# Patient Record
Sex: MALE | Race: ASIAN | NOT HISPANIC OR LATINO
[De-identification: names, ages, dates, MRNs, and addresses within clinical notes are randomized per-mention and may not be internally consistent; named-entity substitution may affect disease eponyms.]

---

## 2017-03-25 ENCOUNTER — APPOINTMENT (OUTPATIENT)
Dept: INTERNAL MEDICINE | Facility: CLINIC | Age: 67
End: 2017-03-25

## 2017-04-17 ENCOUNTER — EMERGENCY (EMERGENCY)
Facility: HOSPITAL | Age: 67
LOS: 1 days | Discharge: HOME | End: 2017-04-17
Attending: EMERGENCY MEDICINE | Admitting: EMERGENCY MEDICINE
Payer: COMMERCIAL

## 2017-04-17 VITALS
TEMPERATURE: 98 F | RESPIRATION RATE: 16 BRPM | DIASTOLIC BLOOD PRESSURE: 91 MMHG | OXYGEN SATURATION: 96 % | SYSTOLIC BLOOD PRESSURE: 148 MMHG | HEART RATE: 80 BPM

## 2017-04-17 VITALS
OXYGEN SATURATION: 98 % | DIASTOLIC BLOOD PRESSURE: 88 MMHG | SYSTOLIC BLOOD PRESSURE: 144 MMHG | HEART RATE: 78 BPM | RESPIRATION RATE: 16 BRPM

## 2017-04-17 DIAGNOSIS — I10 ESSENTIAL (PRIMARY) HYPERTENSION: ICD-10-CM

## 2017-04-17 DIAGNOSIS — E78.5 HYPERLIPIDEMIA, UNSPECIFIED: ICD-10-CM

## 2017-04-17 DIAGNOSIS — R19.7 DIARRHEA, UNSPECIFIED: ICD-10-CM

## 2017-04-17 LAB
ALBUMIN SERPL ELPH-MCNC: 3.5 G/DL — SIGNIFICANT CHANGE UP (ref 3.4–5)
ALP SERPL-CCNC: 69 U/L — SIGNIFICANT CHANGE UP (ref 40–120)
ALT FLD-CCNC: 20 U/L — SIGNIFICANT CHANGE UP (ref 12–42)
ANION GAP SERPL CALC-SCNC: 8 MMOL/L — LOW (ref 9–16)
AST SERPL-CCNC: 8 U/L — LOW (ref 15–37)
BASOPHILS NFR BLD AUTO: 0.3 % — SIGNIFICANT CHANGE UP (ref 0–2)
BILIRUB SERPL-MCNC: 0.9 MG/DL — SIGNIFICANT CHANGE UP (ref 0.2–1.2)
BUN SERPL-MCNC: 17 MG/DL — SIGNIFICANT CHANGE UP (ref 7–23)
CALCIUM SERPL-MCNC: 8.4 MG/DL — LOW (ref 8.5–10.5)
CHLORIDE SERPL-SCNC: 112 MMOL/L — HIGH (ref 96–108)
CO2 SERPL-SCNC: 21 MMOL/L — LOW (ref 22–31)
CREAT SERPL-MCNC: 1.08 MG/DL — SIGNIFICANT CHANGE UP (ref 0.5–1.3)
EOSINOPHIL NFR BLD AUTO: 1.9 % — SIGNIFICANT CHANGE UP (ref 0–6)
GLUCOSE SERPL-MCNC: 96 MG/DL — SIGNIFICANT CHANGE UP (ref 70–99)
HCT VFR BLD CALC: 42.8 % — SIGNIFICANT CHANGE UP (ref 39–50)
HGB BLD-MCNC: 14.8 G/DL — SIGNIFICANT CHANGE UP (ref 13–17)
LYMPHOCYTES # BLD AUTO: 24.6 % — SIGNIFICANT CHANGE UP (ref 13–44)
MCHC RBC-ENTMCNC: 30.5 PG — SIGNIFICANT CHANGE UP (ref 27–34)
MCHC RBC-ENTMCNC: 34.6 G/DL — SIGNIFICANT CHANGE UP (ref 32–36)
MCV RBC AUTO: 88.2 FL — SIGNIFICANT CHANGE UP (ref 80–100)
MONOCYTES NFR BLD AUTO: 12.4 % — SIGNIFICANT CHANGE UP (ref 2–14)
NEUTROPHILS NFR BLD AUTO: 60.8 % — SIGNIFICANT CHANGE UP (ref 43–77)
PLATELET # BLD AUTO: 243 K/UL — SIGNIFICANT CHANGE UP (ref 150–400)
POTASSIUM SERPL-MCNC: 3.7 MMOL/L — SIGNIFICANT CHANGE UP (ref 3.5–5.3)
POTASSIUM SERPL-SCNC: 3.7 MMOL/L — SIGNIFICANT CHANGE UP (ref 3.5–5.3)
PROT SERPL-MCNC: 7.3 G/DL — SIGNIFICANT CHANGE UP (ref 6.4–8.2)
RBC # BLD: 4.85 M/UL — SIGNIFICANT CHANGE UP (ref 4.2–5.8)
RBC # FLD: 13.7 % — SIGNIFICANT CHANGE UP (ref 10.3–16.9)
SODIUM SERPL-SCNC: 141 MMOL/L — SIGNIFICANT CHANGE UP (ref 135–145)
WBC # BLD: 10.2 K/UL — SIGNIFICANT CHANGE UP (ref 3.8–10.5)
WBC # FLD AUTO: 10.2 K/UL — SIGNIFICANT CHANGE UP (ref 3.8–10.5)

## 2017-04-17 PROCEDURE — 83690 ASSAY OF LIPASE: CPT

## 2017-04-17 PROCEDURE — 99284 EMERGENCY DEPT VISIT MOD MDM: CPT

## 2017-04-17 PROCEDURE — 80053 COMPREHEN METABOLIC PANEL: CPT

## 2017-04-17 PROCEDURE — 36415 COLL VENOUS BLD VENIPUNCTURE: CPT

## 2017-04-17 PROCEDURE — 85025 COMPLETE CBC W/AUTO DIFF WBC: CPT

## 2017-04-17 RX ORDER — SODIUM CHLORIDE 9 MG/ML
1000 INJECTION INTRAMUSCULAR; INTRAVENOUS; SUBCUTANEOUS ONCE
Qty: 0 | Refills: 0 | Status: COMPLETED | OUTPATIENT
Start: 2017-04-17 | End: 2017-04-17

## 2017-04-17 RX ORDER — MOXIFLOXACIN HYDROCHLORIDE TABLETS, 400 MG 400 MG/1
1 TABLET, FILM COATED ORAL
Qty: 6 | Refills: 0 | OUTPATIENT
Start: 2017-04-17 | End: 2017-04-20

## 2017-04-17 RX ADMIN — SODIUM CHLORIDE 2000 MILLILITER(S): 9 INJECTION INTRAMUSCULAR; INTRAVENOUS; SUBCUTANEOUS at 17:25

## 2017-04-17 RX ADMIN — SODIUM CHLORIDE 2000 MILLILITER(S): 9 INJECTION INTRAMUSCULAR; INTRAVENOUS; SUBCUTANEOUS at 16:18

## 2017-04-17 NOTE — ED ADULT NURSE NOTE - OBJECTIVE STATEMENT
received pt in room 4. A&Ox3, presents to ed for c.o diarrhea x 3 days, watery and explosive. denies abd pain. no vomiting. pt recent travel to [facility]. denies cp, no sob. denies fever or chills. iv placed, labs drawn. iv ns infusing. awaiting md nicole. will continue to monitor.

## 2017-04-17 NOTE — ED PROVIDER NOTE - OBJECTIVE STATEMENT
65 y/o male with hx of HTN, HLD c/o diarrhea x 3 days. pt states "many" episodes of watery stool per day. no fever or chills. no abd pain, n/v. no bloody stool or melena. no back pain. pt states arrived back from the [facility] on 4/8/17. no further complaints. no recent antibiotic use. no sick contacts.

## 2017-04-17 NOTE — ED PROVIDER NOTE - ATTENDING CONTRIBUTION TO CARE
67 yo male with non-bloody diarrhea x 3 days 4-5 episodes-- nausea-  no vomiting-  noted recent travel to [facility]- no cough or sob  likely travelers diarrhea - will rx cipro x 3 days  stable for dc - return prec d/w pt including fevers chills N/V

## 2017-04-17 NOTE — ED ADULT TRIAGE NOTE - CHIEF COMPLAINT QUOTE
Patient c/o of diarrhea x3days.  Denies abd pain, denies blood in diarrhea.  Denies recent abx usage, or recent hospitalizations.

## 2017-05-24 ENCOUNTER — APPOINTMENT (OUTPATIENT)
Dept: INTERNAL MEDICINE | Facility: CLINIC | Age: 67
End: 2017-05-24

## 2017-05-24 ENCOUNTER — LABORATORY RESULT (OUTPATIENT)
Age: 67
End: 2017-05-24

## 2017-05-24 ENCOUNTER — NON-APPOINTMENT (OUTPATIENT)
Age: 67
End: 2017-05-24

## 2017-05-24 VITALS
OXYGEN SATURATION: 98 % | RESPIRATION RATE: 18 BRPM | HEART RATE: 57 BPM | BODY MASS INDEX: 28.19 KG/M2 | DIASTOLIC BLOOD PRESSURE: 90 MMHG | TEMPERATURE: 98 F | WEIGHT: 180 LBS | SYSTOLIC BLOOD PRESSURE: 139 MMHG

## 2017-05-24 VITALS — DIASTOLIC BLOOD PRESSURE: 70 MMHG | SYSTOLIC BLOOD PRESSURE: 110 MMHG

## 2017-05-24 DIAGNOSIS — G44.209 TENSION-TYPE HEADACHE, UNSPECIFIED, NOT INTRACTABLE: ICD-10-CM

## 2017-05-24 DIAGNOSIS — Z23 ENCOUNTER FOR IMMUNIZATION: ICD-10-CM

## 2017-05-24 DIAGNOSIS — R79.89 OTHER SPECIFIED ABNORMAL FINDINGS OF BLOOD CHEMISTRY: ICD-10-CM

## 2017-05-24 DIAGNOSIS — I45.10 UNSPECIFIED RIGHT BUNDLE-BRANCH BLOCK: ICD-10-CM

## 2017-05-24 DIAGNOSIS — Z12.11 ENCOUNTER FOR SCREENING FOR MALIGNANT NEOPLASM OF COLON: ICD-10-CM

## 2017-05-24 DIAGNOSIS — J30.2 OTHER SEASONAL ALLERGIC RHINITIS: ICD-10-CM

## 2017-05-25 ENCOUNTER — RESULT REVIEW (OUTPATIENT)
Age: 67
End: 2017-05-25

## 2017-05-25 DIAGNOSIS — Z23 ENCOUNTER FOR IMMUNIZATION: ICD-10-CM

## 2017-05-25 DIAGNOSIS — R82.99 OTHER ABNORMAL FINDINGS IN URINE: ICD-10-CM

## 2017-05-25 DIAGNOSIS — Z20.5 CONTACT WITH AND (SUSPECTED) EXPOSURE TO VIRAL HEPATITIS: ICD-10-CM

## 2017-05-25 DIAGNOSIS — R76.11 NONSPECIFIC REACTION TO TUBERCULIN SKIN TEST W/OUT ACTIVE TUBERCULOSIS: ICD-10-CM

## 2017-05-30 LAB
ADJUSTED MITOGEN: >10 IU/ML
ADJUSTED TB AG: 0.02 IU/ML
ALBUMIN SERPL ELPH-MCNC: 4.4 G/DL
ALP BLD-CCNC: 64 U/L
ALT SERPL-CCNC: 15 U/L
ANION GAP SERPL CALC-SCNC: 13 MMOL/L
APPEARANCE: CLEAR
AST SERPL-CCNC: 20 U/L
BASOPHILS # BLD AUTO: 0.04 K/UL
BASOPHILS NFR BLD AUTO: 0.5 %
BILIRUB SERPL-MCNC: 0.6 MG/DL
BILIRUBIN URINE: NEGATIVE
BLOOD URINE: ABNORMAL
BUN SERPL-MCNC: 17 MG/DL
CALCIUM SERPL-MCNC: 9.2 MG/DL
CHLORIDE SERPL-SCNC: 106 MMOL/L
CHOLEST SERPL-MCNC: 135 MG/DL
CHOLEST/HDLC SERPL: 5 RATIO
CO2 SERPL-SCNC: 22 MMOL/L
COLOR: YELLOW
CREAT SERPL-MCNC: 1.22 MG/DL
EOSINOPHIL # BLD AUTO: 0.36 K/UL
EOSINOPHIL NFR BLD AUTO: 4.6 %
GLUCOSE QUALITATIVE U: NORMAL MG/DL
GLUCOSE SERPL-MCNC: 107 MG/DL
HAV IGG+IGM SER QL: REACTIVE
HBA1C MFR BLD HPLC: 5.6 %
HBV SURFACE AB SER QL: REACTIVE
HBV SURFACE AG SER QL: NONREACTIVE
HCT VFR BLD CALC: 44 %
HCV AB SER QL: NONREACTIVE
HCV S/CO RATIO: 0.09 S/CO
HDLC SERPL-MCNC: 27 MG/DL
HGB BLD-MCNC: 14.2 G/DL
IMM GRANULOCYTES NFR BLD AUTO: 0.3 %
KETONES URINE: NEGATIVE
LDLC SERPL CALC-MCNC: 47 MG/DL
LEUKOCYTE ESTERASE URINE: NEGATIVE
LYMPHOCYTES # BLD AUTO: 2.02 K/UL
LYMPHOCYTES NFR BLD AUTO: 25.8 %
M TB IFN-G BLD-IMP: NEGATIVE
MAN DIFF?: NORMAL
MCHC RBC-ENTMCNC: 30.1 PG
MCHC RBC-ENTMCNC: 32.3 GM/DL
MCV RBC AUTO: 93.2 FL
MONOCYTES # BLD AUTO: 0.32 K/UL
MONOCYTES NFR BLD AUTO: 4.1 %
NEUTROPHILS # BLD AUTO: 5.06 K/UL
NEUTROPHILS NFR BLD AUTO: 64.7 %
NITRITE URINE: NEGATIVE
PH URINE: 6
PLATELET # BLD AUTO: 196 K/UL
POTASSIUM SERPL-SCNC: 4.5 MMOL/L
PROT SERPL-MCNC: 7 G/DL
PROTEIN URINE: 30 MG/DL
PSA SERPL-MCNC: 0.46 NG/ML
QUANTIFERON GOLD NIL: 0.04 IU/ML
RBC # BLD: 4.72 M/UL
RBC # FLD: 14 %
SODIUM SERPL-SCNC: 141 MMOL/L
SPECIFIC GRAVITY URINE: 1.02
TESTOST BND SERPL-MCNC: 5.6 PG/ML
TESTOST SERPL-MCNC: 247.8 NG/DL
TRIGL SERPL-MCNC: 307 MG/DL
TSH SERPL-ACNC: 1.75 UIU/ML
UROBILINOGEN URINE: NORMAL MG/DL
WBC # FLD AUTO: 7.82 K/UL

## 2018-07-28 PROBLEM — J30.2 SEASONAL ALLERGIES: Status: ACTIVE | Noted: 2017-05-24

## 2018-11-28 ENCOUNTER — OUTPATIENT (OUTPATIENT)
Dept: OUTPATIENT SERVICES | Facility: HOSPITAL | Age: 68
LOS: 1 days | End: 2018-11-28
Payer: COMMERCIAL

## 2018-11-28 DIAGNOSIS — R94.39 ABNORMAL RESULT OF OTHER CARDIOVASCULAR FUNCTION STUDY: ICD-10-CM

## 2018-11-28 PROBLEM — I10 ESSENTIAL (PRIMARY) HYPERTENSION: Chronic | Status: ACTIVE | Noted: 2017-04-17

## 2018-11-28 PROBLEM — E78.5 HYPERLIPIDEMIA, UNSPECIFIED: Chronic | Status: ACTIVE | Noted: 2017-04-17

## 2018-11-28 PROCEDURE — 93017 CV STRESS TEST TRACING ONLY: CPT

## 2018-11-28 PROCEDURE — 93018 CV STRESS TEST I&R ONLY: CPT

## 2018-11-28 PROCEDURE — 93016 CV STRESS TEST SUPVJ ONLY: CPT

## 2018-11-28 PROCEDURE — A9502: CPT

## 2018-11-28 PROCEDURE — 78452 HT MUSCLE IMAGE SPECT MULT: CPT | Mod: 26

## 2018-11-28 PROCEDURE — 78452 HT MUSCLE IMAGE SPECT MULT: CPT

## 2019-03-21 NOTE — DATA REVIEWED
[de-identified] : 	\par Exam requested by:\par LOS TOTH MD\par [address]\par [facility] [address]\par SITE PERFORMED: [facility]\par Patient: JOANNE SOLIZ\par YOB: 1950\par Phone: [phone]\par MRN: [id number] Acc: [id number]\par Date of Exam: 12-\par  \par EXAM: MRI CERVICAL SPINE WITHOUT CONTRAST\par \par HISTORY: Neck pain.\par \par TECHNIQUE: MRI of the cervical spine was performed without the administration of intravenous gadolinium on December 29, 2018.\par \par COMPARISON: Comparison is made to the prior MRI of the brain performed on 10/9/2013.\par \par FINDINGS: \par \par There has been anterior cervical discectomy and fusion from the C3-C4 level through to the C5-C6 level. \par \par There is straightening of the normal cervical spine lordosis. There is no gross spondylolisthesis.    \par \par The craniovertebral junction and atlantoaxial articulation are normal. 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 \par \par The vertebral body heights are maintained. There is maintenance of the disc space heights .\par \par There is no bone marrow or soft tissue edema. The bone marrow signal is normal.\par \par At C1-C2, there is no canal stenosis.\par \par At C2-C3, there is a broad-based central disc protrusion with endplate osteophytes and new degenerative changes of the left greater than right facet joints which result in mild canal stenosis with mild left greater than right foraminal narrowing. There is mild flattening of the ventral margin of the spinal cord without associated signal abnormality.\par \par At C3-C4, there is a right lateral endplate osteophyte and degenerative changes of the right uncovertebral joint which result in moderate narrowing of the right lateral recess and neural foramen. There is no canal stenosis.\par \par At C4-C5, there is a left central endplate osteophyte mild/moderate spinal canal stenosis, asymmetric to the left. There is contact of the ventral margin of the spinal cord. There is no foraminal narrowing.\par \par At C5-C6, there are endplate osteophytes, asymmetric to the right which results in moderate canal stenosis with severe right and moderate left foraminal narrowing.\par \par At C6-C7, there is a new central disc protrusion with marginal endplate osteophytes which result in mild canal stenosis with mild right greater than left foraminal narrowing.\par \par At C7-T1, there is a shallow central disc protrusion and mild degenerative changes of the facet joints without canal or foraminal narrowing.\par \par Partially imaged, there are lesions in bilateral lobes of the thyroid gland. The larger of the 2 lesions contains a calcification, measures 1.7 cm and is in the left lobe of the thyroid gland.\par \par IMPRESSION:\par \par Status post interval ACDF from the C3-C4 level through to the C5-C6 level.\par \par Multilevel degenerative disc and endplate changes with degenerative changes of the facet and uncovertebral joints, as described.\par \par C2-C3: Mild canal stenosis with new mild left greater than right foraminal narrowing. There is mild flattening of the ventral margin of the spinal cord without associated signal abnormality.\par \par C5-C6: Mild canal stenosis and severe right and moderate left foraminal narrowing.\par \par C6-C7: New mild canal stenosis with mild right foraminal narrowing.\par \par C4-C5: Mild/moderate canal stenosis asymmetric to the left without foraminal narrowing. \par \par C3-C4: Moderate narrowing of the right lateral recess and neural foramen. No canal stenosis.\par \par Partially imaged, there are lesions in bilateral lobes of the thyroid gland. The larger of the 2 lesions contains a calcification, measures 1.7 cm and is in the left lobe of the thyroid gland.\par \par Ectasia of the basilar artery, partially imaged and grossly unchanged from the prior study.\par Thank you for the opportunity to participate in the care of this patient.  \par  \par VIJAY SHOEMAKER MD  - Electronically Signed: 12- 10:31 AM \par Physician to Physician Direct Line is: [phone]\par Exam requested by:LOS TOTH MD [de-identified] : \par Exam requested by:\par LOS TOTH MD\par [address]\par [facility] [address]\par SITE PERFORMED: [facility]\par Patient: JOANNE SOLIZ\par YOB: 1950\par Phone: [phone]\par MRN: [id number] Acc: [id number]\par Date of Exam: 12-\par  \par EXAM: X-RAY CERVICAL SPINE 6 OR MORE VIEWS\par \par HISTORY: Neck pain\par \par TECHNIQUE: Frontal, lateral, open-mouth, bilateral oblique, and flexion/extension lateral (7) views of the cervical spine were performed on December 29, 2018.\par \par COMPARISON: Comparison is made to the prior study of 10/9/2013. \par \par FINDINGS:  \par \par There has been interval anterior cervical discectomy with fusion at the C3-C4, C4-C5 and C5-C6 levels.\par \par There is an anterior plate spanning the C3-C4, C4-C5 and C5-C6 disc spaces with bilateral anterior vertebral body screws in the respective vertebra. There are lucent disc spacer devices in the intervening disc spaces which appears fused to the adjacent endplates.\par \par The C7 vertebral body is partially obscured by shoulder artifact.\par \par The atlantodental interval is maintained.\par \par There is straightening of the normal cervical spine lordosis. There is no gross spondylolisthesis hours dynamic instability.\par \par The vertebral body heights are maintained. The disc space heights are mildly narrowed at the C2-C3 and C6-C7 levels with associated endplate sclerosis and anterior osteophyte formation. There is spurring of the uncovertebral joints at the C5-C6 and C6-C7 levels and of the left uncovertebral joint at the C3-C4 level.\par \par The bone mineral density is normal. \par \par There is no prevertebral soft tissue swelling.\par \par IMPRESSION:\par \par Status post ACDF from the C3-C4 level through to the C5-C6 level.\par \par There is no fracture of the surgical construct or loosening of screws.\par \par There is fusion across the disc spaces.\par \par Cervical spondylosis above and below the level of the fusion which has developed since the prior study.\par Thank you for the opportunity to participate in the care of this patient.  \par  \par VIJAY SHOEMAKER MD  - Electronically Signed: 12- 10:32 AM \par Physician to Physician Direct Line is: [phone]\par Exam requested by:LOS TOTH MD

## 2019-03-21 NOTE — CONSULT LETTER
[Dear  ___] : Dear  [unfilled], [Consult Letter:] : I had the pleasure of evaluating your patient, [unfilled]. [Please see my note below.] : Please see my note below. [Consult Closing:] : Thank you very much for allowing me to participate in the care of this patient.  If you have any questions, please do not hesitate to contact me. [Sincerely,] : Sincerely, [FreeTextEntry2] : LOS TOTH MD\par [address]\par [facility] [address] [FreeTextEntry3] : Soren Morse MD

## 2019-03-25 ENCOUNTER — APPOINTMENT (OUTPATIENT)
Dept: NEUROSURGERY | Facility: CLINIC | Age: 69
End: 2019-03-25

## 2019-04-24 ENCOUNTER — APPOINTMENT (OUTPATIENT)
Dept: NEPHROLOGY | Facility: CLINIC | Age: 69
End: 2019-04-24
Payer: COMMERCIAL

## 2019-04-24 VITALS — HEART RATE: 60 BPM | DIASTOLIC BLOOD PRESSURE: 62 MMHG | RESPIRATION RATE: 16 BRPM | SYSTOLIC BLOOD PRESSURE: 110 MMHG

## 2019-04-24 DIAGNOSIS — R79.89 OTHER SPECIFIED ABNORMAL FINDINGS OF BLOOD CHEMISTRY: ICD-10-CM

## 2019-04-24 DIAGNOSIS — Z82.49 FAMILY HISTORY OF ISCHEMIC HEART DISEASE AND OTHER DISEASES OF THE CIRCULATORY SYSTEM: ICD-10-CM

## 2019-04-24 DIAGNOSIS — Z80.0 FAMILY HISTORY OF MALIGNANT NEOPLASM OF DIGESTIVE ORGANS: ICD-10-CM

## 2019-04-24 PROCEDURE — 99244 OFF/OP CNSLTJ NEW/EST MOD 40: CPT

## 2019-04-24 RX ORDER — AZELASTINE HYDROCHLORIDE 137 UG/1
137 SPRAY, METERED NASAL
Qty: 30 | Refills: 0 | Status: DISCONTINUED | COMMUNITY
Start: 2019-03-18

## 2019-04-24 RX ORDER — DOXYCYCLINE 100 MG/1
100 CAPSULE ORAL
Qty: 14 | Refills: 0 | Status: DISCONTINUED | COMMUNITY
Start: 2019-03-14

## 2019-04-24 RX ORDER — PREDNISOLONE ACETATE 10 MG/ML
1 SUSPENSION/ DROPS OPHTHALMIC
Qty: 5 | Refills: 0 | Status: DISCONTINUED | COMMUNITY
Start: 2019-03-22

## 2019-04-24 RX ORDER — COLCHICINE 0.6 MG/1
0.6 CAPSULE ORAL
Qty: 9 | Refills: 0 | Status: DISCONTINUED | COMMUNITY
Start: 2019-03-14

## 2019-04-24 RX ORDER — MOMETASONE 50 UG/1
50 SPRAY, METERED NASAL
Qty: 17 | Refills: 0 | Status: DISCONTINUED | COMMUNITY
Start: 2019-03-18

## 2019-04-26 LAB
APPEARANCE: CLEAR
BACTERIA: NEGATIVE
BILIRUBIN URINE: NEGATIVE
BLOOD URINE: NORMAL
COLOR: NORMAL
CREAT SPEC-SCNC: 87 MG/DL
CREAT SPEC-SCNC: 87 MG/DL
CREAT/PROT UR: 0.2 RATIO
GLUCOSE QUALITATIVE U: NEGATIVE
HYALINE CASTS: 0 /LPF
KETONES URINE: NEGATIVE
LEUKOCYTE ESTERASE URINE: NEGATIVE
MICROALBUMIN 24H UR DL<=1MG/L-MCNC: 8.5 MG/DL
MICROALBUMIN/CREAT 24H UR-RTO: 96 MG/G
MICROSCOPIC-UA: NORMAL
NITRITE URINE: NEGATIVE
PH URINE: 7
PROT UR-MCNC: 21 MG/DL
PROTEIN URINE: NORMAL
RED BLOOD CELLS URINE: 0 /HPF
SPECIFIC GRAVITY URINE: 1.02
SQUAMOUS EPITHELIAL CELLS: 0 /HPF
UROBILINOGEN URINE: NORMAL
WHITE BLOOD CELLS URINE: 0 /HPF

## 2019-04-26 NOTE — PHYSICAL EXAM
[General Appearance - Alert] : alert [Sclera] : the sclera and conjunctiva were normal [General Appearance - In No Acute Distress] : in no acute distress [PERRL With Normal Accommodation] : pupils were equal in size, round, and reactive to light [Outer Ear] : the ears and nose were normal in appearance [Extraocular Movements] : extraocular movements were intact [Oropharynx] : the oropharynx was normal [Neck Appearance] : the appearance of the neck was normal [Jugular Venous Distention Increased] : there was no jugular-venous distention [Heart Rate And Rhythm] : heart rate was normal and rhythm regular [Auscultation Breath Sounds / Voice Sounds] : lungs were clear to auscultation bilaterally [Heart Sounds] : normal S1 and S2 [Edema] : there was no peripheral edema [Bowel Sounds] : normal bowel sounds [Abdomen Soft] : soft [Abdomen Tenderness] : non-tender [No CVA Tenderness] : no ~M costovertebral angle tenderness [Involuntary Movements] : no involuntary movements were seen [Musculoskeletal - Swelling] : no joint swelling seen [Skin Color & Pigmentation] : normal skin color and pigmentation [Skin Turgor] : normal skin turgor [] : no rash [Cranial Nerves] : cranial nerves 2-12 were intact [No Focal Deficits] : no focal deficits [Impaired Insight] : insight and judgment were intact [Oriented To Time, Place, And Person] : oriented to person, place, and time [Affect] : the affect was normal

## 2019-04-26 NOTE — REVIEW OF SYSTEMS
[As noted in HPI] : as noted in HPI [Lower Ext Edema] : lower extremity edema [As Noted in HPI] : as noted in HPI [Negative] : Heme/Lymph

## 2019-05-07 LAB — URINE CYTOLOGY: NORMAL

## 2019-05-07 NOTE — ASSESSMENT
[FreeTextEntry1] : 69yo M with non obstructive BPH, gout, longstanding HTN and CKD III here for preop renal optimization prior to spinal surgery:\par \par # CKD III w negligible albuminuria likely 2/2 long standing HTN, did u/a today here which didn't show any hematuria although intermittent hematuria can be associated with IgA nephropathy although less likely.\par Reviewed prior labs, radiology and documentation with patient.\par 4/17/19: Cr 1.6 egfr 43\par 10/18 Cr 1.12 11/18 Cr 1.3\par 2017 Cr 1.22  Hep B/C TB quyen negative. u/a from [facility] showing 5-10 rbcs\par 11/18: 24hr urine 2L collected , CrCl 115 (normal), Cr at that time was 1.3. \par \par Electrolytes are wnl, blood pressure is well controlled, from a renal standpoint he's optimized for surgery. Non urgent renal and bladder sono w PVR for next visit. Recommend not using NSAIDs for pain control. \par \par # HTN - well controlled \par  +NURIA, used cpap mask for 6 months then daytime fatigue improved so he's stopped using it. \par \par # Anemia - mild - 12.2 - next visit will check Fe studies and paraproteinemia w/u\par \par # Gout - uric acid 7 10/18 labs, advised against using NSAIDs or mobic for pain 2/2 potential for renal injury from reduced perfusion\par \par BPH - non obstructive - normal PSA tested late last year, on tamsulosin\par \par Addendum 5/7: urine cytology came back w some contamination likely but noted some urothelial cell atypica. Left voicemail for pt letting him know and suggesting f/u with his urologist for cystoscopy in the next few months.

## 2019-05-07 NOTE — CONSULT LETTER
[Dear  ___] : Dear  [unfilled], [Please see my note below.] : Please see my note below. [Consult Letter:] : I had the pleasure of evaluating your patient, [unfilled]. [Sincerely,] : Sincerely, [Consult Closing:] : Thank you very much for allowing me to participate in the care of this patient.  If you have any questions, please do not hesitate to contact me. [DrTrina  ___] : Dr. HUGHES [FreeTextEntry3] : Jacklyn Foster MD\par  of Medicine\par Division of Kidney Diseases and Hypertension\par [facility] \par [employer] [facility] Tel: [phone]\par Email: [email or web address]\par \par
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
## 2019-07-17 ENCOUNTER — APPOINTMENT (OUTPATIENT)
Dept: ULTRASOUND IMAGING | Facility: CLINIC | Age: 69
End: 2019-07-17
Payer: COMMERCIAL

## 2019-07-17 ENCOUNTER — OUTPATIENT (OUTPATIENT)
Dept: OUTPATIENT SERVICES | Facility: HOSPITAL | Age: 69
LOS: 1 days | End: 2019-07-17
Payer: COMMERCIAL

## 2019-07-17 DIAGNOSIS — Z00.8 ENCOUNTER FOR OTHER GENERAL EXAMINATION: ICD-10-CM

## 2019-07-17 PROCEDURE — 76770 US EXAM ABDO BACK WALL COMP: CPT

## 2019-07-17 PROCEDURE — 76770 US EXAM ABDO BACK WALL COMP: CPT | Mod: 26

## 2019-07-24 ENCOUNTER — APPOINTMENT (OUTPATIENT)
Dept: NEPHROLOGY | Facility: CLINIC | Age: 69
End: 2019-07-24
Payer: COMMERCIAL

## 2019-07-24 VITALS — DIASTOLIC BLOOD PRESSURE: 70 MMHG | HEART RATE: 56 BPM | SYSTOLIC BLOOD PRESSURE: 116 MMHG | RESPIRATION RATE: 14 BRPM

## 2019-07-24 DIAGNOSIS — G47.33 OBSTRUCTIVE SLEEP APNEA (ADULT) (PEDIATRIC): ICD-10-CM

## 2019-07-24 DIAGNOSIS — K59.09 OTHER CONSTIPATION: ICD-10-CM

## 2019-07-24 DIAGNOSIS — D50.9 IRON DEFICIENCY ANEMIA, UNSPECIFIED: ICD-10-CM

## 2019-07-24 DIAGNOSIS — Z99.89 OBSTRUCTIVE SLEEP APNEA (ADULT) (PEDIATRIC): ICD-10-CM

## 2019-07-24 PROCEDURE — 99215 OFFICE O/P EST HI 40 MIN: CPT

## 2019-07-24 RX ORDER — MELOXICAM 7.5 MG/1
7.5 TABLET ORAL
Qty: 30 | Refills: 0 | Status: DISCONTINUED | COMMUNITY
Start: 2019-03-12 | End: 2019-07-24

## 2019-07-24 NOTE — PHYSICAL EXAM
[General Appearance - In No Acute Distress] : in no acute distress [General Appearance - Alert] : alert [Sclera] : the sclera and conjunctiva were normal [Extraocular Movements] : extraocular movements were intact [PERRL With Normal Accommodation] : pupils were equal in size, round, and reactive to light [Outer Ear] : the ears and nose were normal in appearance [Oropharynx] : the oropharynx was normal [Jugular Venous Distention Increased] : there was no jugular-venous distention [Neck Appearance] : the appearance of the neck was normal [FreeTextEntry1] : Visible impairment of mobility in turning head R/L or up/down, rests head in slight R sided and downwards direction for comfort 2/2 cervical fusion, well healed scar posterior neck/between scapulas [Auscultation Breath Sounds / Voice Sounds] : lungs were clear to auscultation bilaterally [Heart Rate And Rhythm] : heart rate was normal and rhythm regular [Heart Sounds] : normal S1 and S2 [Bowel Sounds] : normal bowel sounds [Edema] : there was no peripheral edema [Abdomen Soft] : soft [Abdomen Tenderness] : non-tender [No CVA Tenderness] : no ~M costovertebral angle tenderness [Involuntary Movements] : no involuntary movements were seen [Musculoskeletal - Swelling] : no joint swelling seen [Skin Color & Pigmentation] : normal skin color and pigmentation [Skin Turgor] : normal skin turgor [] : no rash [Cranial Nerves] : cranial nerves 2-12 were intact [No Focal Deficits] : no focal deficits [Oriented To Time, Place, And Person] : oriented to person, place, and time [Impaired Insight] : insight and judgment were intact [Affect] : the affect was normal

## 2019-07-24 NOTE — HISTORY OF PRESENT ILLNESS
[FreeTextEntry1] : 67yo M with non obstructive BPH, longstanding HTN and CKD III here for f/u:\par \par PCP Dr. Ra Coley\par \par In significant pain thoracic spine, and very little neck mobility, significant impairment in his quality of life post May cervical/thoracic spinal surgery. Doesn't like to leave his apt because of the pain and limited mobility. Voice also changed the thinks 2/2 intubation. Denies dysphagia\par \par Avoiding nsaids per surgeon because they slow down the healing process. Using muscle relaxants, tried 3 different ones before now flexeril which is taking the edge off the pain. Still unable to go back to work. Uses 500mg tylenol daily as well. Per pt while in hospital post op he had elevated LFTs 2/2 4g daily tylenol being given. \par Occasional R big toe erythema and pain from gout - takes 1 colchicine and sx resolve w/i 2 days. \par BP has improved he stopped amlodipine. 120-130/80-87 at home. \par \par All other ROS

## 2019-07-24 NOTE — REVIEW OF SYSTEMS
[Hoarseness] : hoarseness [Lower Ext Edema] : no extremity edema [Constipation] : constipation [As Noted in HPI] : as noted in HPI [Joint Stiffness] : joint stiffness [Arthralgias] : arthralgias [Negative] : Heme/Lymph

## 2019-07-24 NOTE — ASSESSMENT
[FreeTextEntry1] : 67yo M with non obstructive BPH, longstanding HTN and CKD III here for f/u:\par \par PCP Dr. Ra Coley\par \par # CKD III w negligible albuminuria likely 2/2 long standing HTN\par - prior u/as at times w <10 RBC however most recent w/o hematuria. Intermittent hematuria can be associated with IgA nephropathy although less likely in this case, CKD likely 22 HTN and fluctuations in Cr likely 2/2 NSAID use in the past. Reviewed July labs Cr improved to 1.2. Needs f/u with  for cystoscopy for atypical urine cytology although noted to be a contaminated urine. Still needs to get bladder/renal sono\par Prior Cr trends:\par 4/17/19: Cr 1.6 egfr 43\par 10/18 Cr 1.12 11/18 Cr 1.3\par 2017 Cr 1.22  Hep B/C TB quyen negative. u/a [place] [facility] showing 5-10 rbcs\par 11/18: 24hr urine 2L collected , CrCl 115 (normal), Cr at that time was 1.3. \par \par # HTN - well controlled off amlodipine only on bystolic\par  +NURIA, daytime fatigue improved so not using cpap\par \par # Anemia - Fe deficient - started Fe tabs BID, significant constipation so also suggested Miralax and bisacodyl suppository. \par \par # Gout - uric acid 7.8 goal <6 - started allopurinol, discussed low risk of rash, to stop if develops. cont prn colchicine for flares\par \par # BPH - non obstructive - normal PSA tested late last year, on tamsulosin, needs f/u with  and bladder sono pending\par \par # vit D def - started 3m of ergo

## 2019-09-10 ENCOUNTER — APPOINTMENT (OUTPATIENT)
Dept: MRI IMAGING | Facility: IMAGING CENTER | Age: 69
End: 2019-09-10
Payer: COMMERCIAL

## 2019-09-10 ENCOUNTER — OUTPATIENT (OUTPATIENT)
Dept: OUTPATIENT SERVICES | Facility: HOSPITAL | Age: 69
LOS: 1 days | End: 2019-09-10
Payer: COMMERCIAL

## 2019-09-10 DIAGNOSIS — Z98.0 INTESTINAL BYPASS AND ANASTOMOSIS STATUS: ICD-10-CM

## 2019-09-10 PROCEDURE — 72146 MRI CHEST SPINE W/O DYE: CPT

## 2019-09-10 PROCEDURE — 72156 MRI NECK SPINE W/O & W/DYE: CPT | Mod: 26

## 2019-09-10 PROCEDURE — 72146 MRI CHEST SPINE W/O DYE: CPT | Mod: 26

## 2019-09-10 PROCEDURE — 72156 MRI NECK SPINE W/O & W/DYE: CPT

## 2019-09-10 PROCEDURE — A9585: CPT

## 2020-01-22 ENCOUNTER — APPOINTMENT (OUTPATIENT)
Dept: NEPHROLOGY | Facility: CLINIC | Age: 70
End: 2020-01-22

## 2021-04-05 ENCOUNTER — LABORATORY RESULT (OUTPATIENT)
Age: 71
End: 2021-04-05

## 2021-04-05 ENCOUNTER — APPOINTMENT (OUTPATIENT)
Dept: NEPHROLOGY | Facility: CLINIC | Age: 71
End: 2021-04-05
Payer: COMMERCIAL

## 2021-04-05 VITALS — SYSTOLIC BLOOD PRESSURE: 120 MMHG | WEIGHT: 190 LBS | BODY MASS INDEX: 29.76 KG/M2 | DIASTOLIC BLOOD PRESSURE: 80 MMHG

## 2021-04-05 DIAGNOSIS — R80.9 PROTEINURIA, UNSPECIFIED: ICD-10-CM

## 2021-04-05 PROCEDURE — 99204 OFFICE O/P NEW MOD 45 MIN: CPT | Mod: 95

## 2021-04-05 RX ORDER — COLCHICINE 0.6 MG/1
0.6 CAPSULE ORAL
Qty: 30 | Refills: 3 | Status: DISCONTINUED | COMMUNITY
Start: 2019-07-24 | End: 2021-04-05

## 2021-04-05 NOTE — REVIEW OF SYSTEMS
[Hoarseness] : hoarseness [Lower Ext Edema] : no extremity edema [Constipation] : constipation [As Noted in HPI] : as noted in HPI [Arthralgias] : arthralgias [Joint Stiffness] : joint stiffness [Negative] : Heme/Lymph

## 2021-04-05 NOTE — REASON FOR VISIT
[Home] : at home, [unfilled] , at the time of the visit. [Medical Office: ([facility])___] : at the medical office located in  [Verbal consent obtained from patient] : the patient, [unfilled] [Initial Evaluation] : an initial evaluation [FreeTextEntry1] : for hematuria, proteinuria

## 2021-04-05 NOTE — ASSESSMENT
[FreeTextEntry1] : Mr. SOLIZ is a 70 year old male with  origin and chronic hx of HTN and hematuria and now CKD over the last few years with worsening proteinuria. IgA Nephropathy is highest on the ddx with other chronic interstitial diseases vs Thin basement membrane likely. FSGS might be seen but more secondary here.\par Overall, this is progressive CKD and a kidney bx may help more for diagnostic and prognostic reasons\par Data on treatment with steroids for IgA nephropathy is weak especially if has had significant scarring on renal bx.\par \par Pt also recently had covid vaccine and will check antibodies\par Proceed with renal bx\par Check coags today \par Most likely will need ARB + SGLT2i combo once renal bx confirms IgA nephropathy for conservative mgt and delay need for dialysis\par \par Pt understands the plan\par f.u after kidney bx

## 2021-04-05 NOTE — HISTORY OF PRESENT ILLNESS
[FreeTextEntry1] : Mr. SOLIZ is a 70 year old male with hx of hematuria for years and CKD with crt in 1.6 range here for an opinion. He has had hematuria dating back to 2004. His crt was normal in 2004 at 1.0mg/dl and since 2018 onwards crt started to rise in 1.3 range and then in 2019 was 1.6. He had cysto done in 2019 that was neg. He was seen by Nephrologist in [facility] for a cards clearance and was stable. Since then has followed with a nephrologist in [facility] Dr Sainz and crt in 1.6mg/dl range. He also has non obstructive BPH, longstanding HTN on beta blockers only.  He used to be on ACEi, not sure when that was stopped and why.\par In significant pain thoracic spine, and very little neck mobility, significant impairment in his quality of life post May cervical/thoracic spinal surgery. Doesn't like to leave his apt because of the pain and limited mobility. Voice also changed the thinks 2/2 intubation. Was taking NSAID in the past.\par Strong FH of cancer but no kidney disease\par Primary Nephrologist concerned re IgA Nephropathy. Recently labs also notable for 1.6gm of proteinuria on a 24 hour collection. \par No chronic use of PPIs or herbal meds. -130/80-87 at home. 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 No decrease in appetite, no termors. No edema worsening. no decreased sleep. No foamy urine. no hematuria, no dysuria. no confusion. No SOB, LYNN. No wt loss.\par \par \par PCP Dr. Ra Coley\par \par \par

## 2021-04-06 LAB
ALBUMIN SERPL ELPH-MCNC: 4.2 G/DL
ANION GAP SERPL CALC-SCNC: 10 MMOL/L
APPEARANCE: CLEAR
APTT BLD: 35.4 SEC
BACTERIA: NEGATIVE
BASOPHILS # BLD AUTO: 0.04 K/UL
BASOPHILS NFR BLD AUTO: 0.5 %
BILIRUBIN URINE: NEGATIVE
BLOOD URINE: ABNORMAL
BUN SERPL-MCNC: 20 MG/DL
C3 SERPL-MCNC: 116 MG/DL
C4 SERPL-MCNC: 25 MG/DL
CALCIUM SERPL-MCNC: 9.3 MG/DL
CHLORIDE SERPL-SCNC: 106 MMOL/L
CO2 SERPL-SCNC: 25 MMOL/L
COLOR: NORMAL
COVID-19 NUCLEOCAPSID  GAM ANTIBODY INTERPRETATION: NEGATIVE
COVID-19 SPIKE DOMAIN ANTIBODY INTERPRETATION: POSITIVE
CREAT SERPL-MCNC: 1.47 MG/DL
CREAT SPEC-SCNC: 215 MG/DL
CREAT/PROT UR: 1 RATIO
EOSINOPHIL # BLD AUTO: 0.14 K/UL
EOSINOPHIL NFR BLD AUTO: 1.6 %
GLUCOSE QUALITATIVE U: NEGATIVE
GLUCOSE SERPL-MCNC: 190 MG/DL
HCT VFR BLD CALC: 42.8 %
HGB BLD-MCNC: 13.7 G/DL
HYALINE CASTS: 1 /LPF
IMM GRANULOCYTES NFR BLD AUTO: 0.3 %
INR PPP: 0.95 RATIO
KETONES URINE: NEGATIVE
LEUKOCYTE ESTERASE URINE: NEGATIVE
LYMPHOCYTES # BLD AUTO: 2.14 K/UL
LYMPHOCYTES NFR BLD AUTO: 24.5 %
MAN DIFF?: NORMAL
MCHC RBC-ENTMCNC: 30.2 PG
MCHC RBC-ENTMCNC: 32 GM/DL
MCV RBC AUTO: 94.3 FL
MICROSCOPIC-UA: NORMAL
MONOCYTES # BLD AUTO: 0.46 K/UL
MONOCYTES NFR BLD AUTO: 5.3 %
MPO AB + PR3 PNL SER: NORMAL
NEUTROPHILS # BLD AUTO: 5.93 K/UL
NEUTROPHILS NFR BLD AUTO: 67.8 %
NITRITE URINE: NEGATIVE
PH URINE: 6
PHOSPHATE SERPL-MCNC: 3.9 MG/DL
PLATELET # BLD AUTO: 204 K/UL
POTASSIUM SERPL-SCNC: 3.9 MMOL/L
PROT UR-MCNC: 215 MG/DL
PROTEIN URINE: ABNORMAL
PT BLD: 11.3 SEC
RBC # BLD: 4.54 M/UL
RBC # FLD: 14.2 %
RED BLOOD CELLS URINE: 5 /HPF
SARS-COV-2 AB SERPL IA-ACNC: >250 U/ML
SARS-COV-2 AB SERPL QL IA: 0.08 INDEX
SODIUM SERPL-SCNC: 141 MMOL/L
SPECIFIC GRAVITY URINE: 1.02
SQUAMOUS EPITHELIAL CELLS: 1 /HPF
URINE COMMENTS: NORMAL
UROBILINOGEN URINE: NORMAL
WBC # FLD AUTO: 8.74 K/UL
WHITE BLOOD CELLS URINE: 4 /HPF

## 2021-04-09 ENCOUNTER — TRANSCRIPTION ENCOUNTER (OUTPATIENT)
Age: 71
End: 2021-04-09

## 2021-04-12 ENCOUNTER — APPOINTMENT (OUTPATIENT)
Dept: ULTRASOUND IMAGING | Facility: HOSPITAL | Age: 71
End: 2021-04-12

## 2021-04-13 ENCOUNTER — OUTPATIENT (OUTPATIENT)
Dept: OUTPATIENT SERVICES | Facility: HOSPITAL | Age: 71
LOS: 1 days | End: 2021-04-13
Payer: COMMERCIAL

## 2021-04-13 ENCOUNTER — APPOINTMENT (OUTPATIENT)
Dept: ULTRASOUND IMAGING | Facility: CLINIC | Age: 71
End: 2021-04-13
Payer: COMMERCIAL

## 2021-04-13 DIAGNOSIS — N18.30 CHRONIC KIDNEY DISEASE, STAGE 3 UNSPECIFIED: ICD-10-CM

## 2021-04-13 PROCEDURE — 76775 US EXAM ABDO BACK WALL LIM: CPT

## 2021-04-13 PROCEDURE — 76775 US EXAM ABDO BACK WALL LIM: CPT | Mod: 26

## 2021-05-04 ENCOUNTER — APPOINTMENT (OUTPATIENT)
Dept: UROLOGY | Facility: CLINIC | Age: 71
End: 2021-05-04
Payer: COMMERCIAL

## 2021-05-04 VITALS
TEMPERATURE: 97.1 F | BODY MASS INDEX: 30.53 KG/M2 | DIASTOLIC BLOOD PRESSURE: 86 MMHG | HEART RATE: 55 BPM | HEIGHT: 66 IN | RESPIRATION RATE: 14 BRPM | WEIGHT: 190 LBS | SYSTOLIC BLOOD PRESSURE: 152 MMHG

## 2021-05-04 DIAGNOSIS — R31.29 OTHER MICROSCOPIC HEMATURIA: ICD-10-CM

## 2021-05-04 DIAGNOSIS — N52.9 MALE ERECTILE DYSFUNCTION, UNSPECIFIED: ICD-10-CM

## 2021-05-04 DIAGNOSIS — N40.1 BENIGN PROSTATIC HYPERPLASIA WITH LOWER URINARY TRACT SYMPMS: ICD-10-CM

## 2021-05-04 DIAGNOSIS — N13.8 BENIGN PROSTATIC HYPERPLASIA WITH LOWER URINARY TRACT SYMPMS: ICD-10-CM

## 2021-05-04 PROCEDURE — 99204 OFFICE O/P NEW MOD 45 MIN: CPT

## 2021-05-04 PROCEDURE — 99072 ADDL SUPL MATRL&STAF TM PHE: CPT

## 2021-05-04 PROCEDURE — 88112 CYTOPATH CELL ENHANCE TECH: CPT | Mod: 26

## 2021-05-04 RX ORDER — MONTELUKAST 10 MG/1
10 TABLET, FILM COATED ORAL DAILY
Qty: 30 | Refills: 5 | Status: COMPLETED | COMMUNITY
Start: 2017-05-24 | End: 2021-05-04

## 2021-05-04 RX ORDER — FERROUS GLUCONATE 240(27)MG
240 (27 FE) TABLET ORAL TWICE DAILY
Qty: 180 | Refills: 2 | Status: COMPLETED | COMMUNITY
Start: 2019-07-24 | End: 2021-05-04

## 2021-05-04 RX ORDER — POLYETHYLENE GLYCOL 3350 17 G/17G
17 POWDER, FOR SOLUTION ORAL
Qty: 1 | Refills: 3 | Status: COMPLETED | COMMUNITY
Start: 2019-07-24 | End: 2021-05-04

## 2021-05-04 NOTE — ASSESSMENT
[FreeTextEntry1] : Microscopic materia: Previous results reviewed.  Recommend repeat urinalysis, urine cytology.  Renal ultrasound results reviewed which showed possible small calculus.  No other significant symptoms.  At this time we will continue to follow, if hematuria worsens or symptoms develop, then will recommend office cystoscopy.\par \par BPH: Continue on finasteride and tamsulosin.\par \par Prostate cancer screening:  PSA f/t today.\par \par Thyroid cyst: Referral given for endocrine evaluation.\par \par Follow up in 6 months.

## 2021-05-04 NOTE — PHYSICAL EXAM
[General Appearance - Well Developed] : well developed [General Appearance - Well Nourished] : well nourished [Normal Appearance] : normal appearance [Well Groomed] : well groomed [General Appearance - In No Acute Distress] : no acute distress [Edema] : no peripheral edema [Respiration, Rhythm And Depth] : normal respiratory rhythm and effort [Exaggerated Use Of Accessory Muscles For Inspiration] : no accessory muscle use [Abdomen Soft] : soft [Abdomen Tenderness] : non-tender [Costovertebral Angle Tenderness] : no ~M costovertebral angle tenderness [Urethral Meatus] : meatus normal [Urinary Bladder Findings] : the bladder was normal on palpation [Scrotum] : the scrotum was normal [Testes Mass (___cm)] : there were no testicular masses [Normal Station and Gait] : the gait and station were normal for the patient's age [] : no rash [No Focal Deficits] : no focal deficits [Oriented To Time, Place, And Person] : oriented to person, place, and time [Affect] : the affect was normal [Mood] : the mood was normal [Not Anxious] : not anxious [No Palpable Adenopathy] : no palpable adenopathy [No Prostate Nodules] : no prostate nodules

## 2021-05-04 NOTE — REVIEW OF SYSTEMS
[Recent Weight Gain (___ Lbs)] : recent [unfilled] ~Ulb weight gain [Eyesight Problems] : eyesight problems [Constipation] : constipation [Diarrhea] : diarrhea [No erections] : no erections [Blood in urine that you can see] : blood visible in urine [History of kidney stones] : history of kidney stones [Wake up at night to urinate  How many times?  ___] : wakes up to urinate [unfilled] times during the night [Limb Swelling] : limb swelling [Difficulty Walking] : difficulty walking [see HPI] : see HPI [Negative] : Musculoskeletal [FreeTextEntry2] : high blood pressure

## 2021-05-04 NOTE — HISTORY OF PRESENT ILLNESS
[FreeTextEntry1] : Patient is a 70 year old man, presents tot he office today with microscopic hematuria. \par Patient reports he has a history of microscopic hematuria, he reports the work up has been negative. \par History of BPH, currently on tamsulosin and finasteride. He reports that urinary stream is good. Has increased urgency but he thinks its related to his increased fluid intake. Nocturia x 2. \par \par Denies any family history of prostate cancer, sister \par Brother currently has colon cancer, sister passed away from cancer of the gallbladder \par \par Recent renal sonogram demonstrated a 3 mm nonobstructing left renal lower pole calculus. \par \par History of erectile dysfunction, reports he was using testosterone creams but stopped. \par \par Non-smoker, works as a vascular ultrasound tech.

## 2021-05-05 LAB
APPEARANCE: CLEAR
BACTERIA UR CULT: NORMAL
BACTERIA: NEGATIVE
BILIRUBIN URINE: NEGATIVE
BLOOD URINE: ABNORMAL
COLOR: NORMAL
GLUCOSE QUALITATIVE U: NEGATIVE
HYALINE CASTS: 0 /LPF
KETONES URINE: NEGATIVE
LEUKOCYTE ESTERASE URINE: NEGATIVE
MICROSCOPIC-UA: NORMAL
NITRITE URINE: NEGATIVE
PH URINE: 6
PROTEIN URINE: ABNORMAL
RED BLOOD CELLS URINE: 2 /HPF
SPECIFIC GRAVITY URINE: 1.01
SQUAMOUS EPITHELIAL CELLS: 0 /HPF
UROBILINOGEN URINE: NORMAL
WHITE BLOOD CELLS URINE: 0 /HPF

## 2021-05-06 LAB
PSA FREE FLD-MCNC: 30 %
PSA FREE SERPL-MCNC: 0.1 NG/ML
PSA SERPL-MCNC: 0.35 NG/ML

## 2021-05-07 LAB
TESTOST BND SERPL-MCNC: 6 PG/ML
TESTOST SERPL-MCNC: 171.1 NG/DL
URINE CYTOLOGY: NORMAL

## 2021-06-15 ENCOUNTER — APPOINTMENT (OUTPATIENT)
Dept: ENDOCRINOLOGY | Facility: CLINIC | Age: 71
End: 2021-06-15
Payer: COMMERCIAL

## 2021-06-15 VITALS
BODY MASS INDEX: 30.53 KG/M2 | SYSTOLIC BLOOD PRESSURE: 145 MMHG | OXYGEN SATURATION: 98 % | HEART RATE: 57 BPM | WEIGHT: 190 LBS | TEMPERATURE: 98.5 F | HEIGHT: 66 IN | DIASTOLIC BLOOD PRESSURE: 88 MMHG

## 2021-06-15 VITALS — SYSTOLIC BLOOD PRESSURE: 130 MMHG | DIASTOLIC BLOOD PRESSURE: 78 MMHG

## 2021-06-15 DIAGNOSIS — E04.1 NONTOXIC SINGLE THYROID NODULE: ICD-10-CM

## 2021-06-15 PROCEDURE — 99072 ADDL SUPL MATRL&STAF TM PHE: CPT

## 2021-06-15 PROCEDURE — 36415 COLL VENOUS BLD VENIPUNCTURE: CPT

## 2021-06-15 PROCEDURE — 99204 OFFICE O/P NEW MOD 45 MIN: CPT | Mod: 25

## 2021-06-16 DIAGNOSIS — D72.829 ELEVATED WHITE BLOOD CELL COUNT, UNSPECIFIED: ICD-10-CM

## 2021-06-16 LAB
25(OH)D3 SERPL-MCNC: 51.9 NG/ML
ALBUMIN SERPL ELPH-MCNC: 4.3 G/DL
ALP BLD-CCNC: 80 U/L
ALT SERPL-CCNC: 21 U/L
ANION GAP SERPL CALC-SCNC: 12 MMOL/L
AST SERPL-CCNC: 20 U/L
BASOPHILS # BLD AUTO: 0.04 K/UL
BASOPHILS NFR BLD AUTO: 0.4 %
BILIRUB SERPL-MCNC: 0.5 MG/DL
BUN SERPL-MCNC: 18 MG/DL
CALCIUM SERPL-MCNC: 9.6 MG/DL
CHLORIDE SERPL-SCNC: 106 MMOL/L
CO2 SERPL-SCNC: 21 MMOL/L
CREAT SERPL-MCNC: 1.29 MG/DL
EOSINOPHIL # BLD AUTO: 0.26 K/UL
EOSINOPHIL NFR BLD AUTO: 2.3 %
GLUCOSE SERPL-MCNC: 86 MG/DL
HCT VFR BLD CALC: 43.8 %
HGB BLD-MCNC: 13.6 G/DL
IMM GRANULOCYTES NFR BLD AUTO: 0.4 %
LYMPHOCYTES # BLD AUTO: 2.86 K/UL
LYMPHOCYTES NFR BLD AUTO: 25.5 %
MAN DIFF?: NORMAL
MCHC RBC-ENTMCNC: 29.7 PG
MCHC RBC-ENTMCNC: 31.1 GM/DL
MCV RBC AUTO: 95.6 FL
MONOCYTES # BLD AUTO: 0.72 K/UL
MONOCYTES NFR BLD AUTO: 6.4 %
NEUTROPHILS # BLD AUTO: 7.27 K/UL
NEUTROPHILS NFR BLD AUTO: 65 %
PLATELET # BLD AUTO: 196 K/UL
POTASSIUM SERPL-SCNC: 4.3 MMOL/L
PROT SERPL-MCNC: 7.8 G/DL
RBC # BLD: 4.58 M/UL
RBC # FLD: 13.9 %
SODIUM SERPL-SCNC: 139 MMOL/L
T3FREE SERPL-MCNC: 2.44 PG/ML
T4 FREE SERPL-MCNC: 1.1 NG/DL
TSH SERPL-ACNC: 4.09 UIU/ML
WBC # FLD AUTO: 11.2 K/UL

## 2021-06-27 NOTE — HISTORY OF PRESENT ILLNESS
[FreeTextEntry1] : Mr. SOLIZ  is a 70 year old  male  who presents for initial endocrine evaluation. He presents with regard to a history of thyroid nodularity. He had cervical thoracic fusion, and thyroid nodule was incidentally found on CT scan. \par Thyroid US from 02/08/2021 revealed right upper pole nodule measuring 0.9 cm, a right cystic nodule measuring 1.0 cm, a rt hypoechoic nodule measuring 1.1 cm, and several left thyroid nodules. \par Pt reports hoarseness. \par \par Pt reports cold intolerances and 15 lb weight gain since Jan 2021. \par \par Had FNA of right nodule in March 2021 in [place] [[facility]] with benign findings per pt . \par Denies FMHx of thyroid disease. \par \par Additional medical history includes that of HTN, HLD, anemia, sleep apnea, IgA nephropathy ?, and gout?, and elevated creatinine. \par On Farxiga for nephropathy.  \par Too, hx of Pre-DM. Diet mostly consists of rice. \par Taking amlodipine, bystolic, finasteride, flomax, allopurinol, crestor, Norvasc, and vitamin D3 weekly.

## 2021-08-06 LAB
THYROGLOB AB SERPL-ACNC: <20 IU/ML
THYROPEROXIDASE AB SERPL IA-ACNC: <10 IU/ML

## 2021-08-17 ENCOUNTER — APPOINTMENT (OUTPATIENT)
Dept: NEPHROLOGY | Facility: CLINIC | Age: 71
End: 2021-08-17
Payer: COMMERCIAL

## 2021-08-17 VITALS
SYSTOLIC BLOOD PRESSURE: 129 MMHG | HEART RATE: 57 BPM | DIASTOLIC BLOOD PRESSURE: 75 MMHG | TEMPERATURE: 97.9 F | HEIGHT: 66 IN | WEIGHT: 191.8 LBS | OXYGEN SATURATION: 100 % | BODY MASS INDEX: 30.82 KG/M2

## 2021-08-17 LAB
BASOPHILS # BLD AUTO: 0.04 K/UL
BASOPHILS NFR BLD AUTO: 0.4 %
EOSINOPHIL # BLD AUTO: 0.21 K/UL
EOSINOPHIL NFR BLD AUTO: 2.1 %
HCT VFR BLD CALC: 41.8 %
HGB BLD-MCNC: 13.5 G/DL
IMM GRANULOCYTES NFR BLD AUTO: 0.3 %
LYMPHOCYTES # BLD AUTO: 2.28 K/UL
LYMPHOCYTES NFR BLD AUTO: 23 %
MAN DIFF?: NORMAL
MCHC RBC-ENTMCNC: 29.9 PG
MCHC RBC-ENTMCNC: 32.3 GM/DL
MCV RBC AUTO: 92.7 FL
MONOCYTES # BLD AUTO: 0.63 K/UL
MONOCYTES NFR BLD AUTO: 6.4 %
NEUTROPHILS # BLD AUTO: 6.73 K/UL
NEUTROPHILS NFR BLD AUTO: 67.8 %
PLATELET # BLD AUTO: 200 K/UL
RBC # BLD: 4.51 M/UL
RBC # FLD: 14.3 %
WBC # FLD AUTO: 9.92 K/UL

## 2021-08-17 PROCEDURE — 99213 OFFICE O/P EST LOW 20 MIN: CPT

## 2021-08-17 NOTE — ASSESSMENT
[FreeTextEntry1] : Mr. SOLIZ is a 70 year old male with  origin and chronic hx of HTN and hematuria and now CKD over the last few years with worsening proteinuria. IgA Nephropathy is highest on the ddx with other chronic interstitial diseases vs Thin basement membrane likely. FSGS might be seen but more secondary here.\par Overall, this is progressive CKD and a kidney bx may help more for diagnostic and prognostic reasons\par Data on treatment with steroids for IgA nephropathy is weak especially if has had significant scarring on renal bx.\par \par IgA Neph with CKD- no bx for now as pt would defer\par Cont ARB + SGLT2i combo for now and monitor renal function and check proteinuria\par Check COVID ab titers\par Check labs today and see if needs any further titration for now\par \par f/.u 2 months

## 2021-08-17 NOTE — HISTORY OF PRESENT ILLNESS
[FreeTextEntry1] : Mr. SOLIZ is a 70 year old male with hx of hematuria for years and CKD with crt in 1.6 range here for an opinion. He has had hematuria dating back to 2004. His crt was normal in 2004 at 1.0mg/dl and since 2018 onwards crt started to rise in 1.3 range and then in 2019 was 1.6. He had cysto done in 2019 that was neg. He was seen by Nephrologist in [facility] for a cards clearance and was stable. Since then has followed with a nephrologist in [facility] Dr Sainz and crt in 1.6mg/dl range. He also has non obstructive BPH, longstanding HTN on beta blockers only.  He used to be on ACEi, not sure when that was stopped and why.\par In significant pain thoracic spine, and very little neck mobility, significant impairment in his quality of life post May cervical/thoracic spinal surgery. Doesn't like to leave his apt because of the pain and limited mobility. Voice also changed the thinks 2/2 intubation. Was taking NSAID in the past.\par Strong FH of cancer but no kidney disease\par Primary Nephrologist concerned re IgA Nephropathy. Recently labs also notable for 1.6gm of proteinuria on a 24 hour collection. \par Since last visit, didn't ge renal bx but had started on ARB and SLGT2i and feels better. urinating more than usual. Feels more energy. Most recent UA with less protein and crt is improved. \par No n/v. No decrease in appetite, no termors. No edema worsening. no decreased sleep. No foamy urine. no hematuria, no dysuria. no confusion. No SOB, LYNN. No wt loss.\par \par \par \par PCP Dr. Ra Coley\par \par \par

## 2021-08-18 LAB
25(OH)D3 SERPL-MCNC: 40.8 NG/ML
ALBUMIN SERPL ELPH-MCNC: 4.2 G/DL
ANION GAP SERPL CALC-SCNC: 13 MMOL/L
APPEARANCE: CLEAR
BACTERIA: NEGATIVE
BILIRUBIN URINE: NEGATIVE
BLOOD URINE: ABNORMAL
BUN SERPL-MCNC: 20 MG/DL
CALCIUM SERPL-MCNC: 9.8 MG/DL
CHLORIDE SERPL-SCNC: 105 MMOL/L
CO2 SERPL-SCNC: 22 MMOL/L
COLOR: NORMAL
COVID-19 SPIKE DOMAIN ANTIBODY INTERPRETATION: POSITIVE
CREAT SERPL-MCNC: 1.27 MG/DL
CREAT SPEC-SCNC: 54 MG/DL
CREAT/PROT UR: 1 RATIO
ESTIMATED AVERAGE GLUCOSE: 114 MG/DL
GLUCOSE QUALITATIVE U: ABNORMAL
GLUCOSE SERPL-MCNC: 88 MG/DL
HBA1C MFR BLD HPLC: 5.6 %
HYALINE CASTS: 0 /LPF
KETONES URINE: NEGATIVE
LEUKOCYTE ESTERASE URINE: NEGATIVE
MICROSCOPIC-UA: NORMAL
NITRITE URINE: NEGATIVE
PH URINE: 6
PHOSPHATE SERPL-MCNC: 3.6 MG/DL
POTASSIUM SERPL-SCNC: 3.9 MMOL/L
PROT UR-MCNC: 53 MG/DL
PROTEIN URINE: ABNORMAL
RED BLOOD CELLS URINE: 0 /HPF
SARS-COV-2 AB SERPL IA-ACNC: >250 U/ML
SODIUM SERPL-SCNC: 140 MMOL/L
SPECIFIC GRAVITY URINE: 1.01
SQUAMOUS EPITHELIAL CELLS: 0 /HPF
UROBILINOGEN URINE: NORMAL
WHITE BLOOD CELLS URINE: 1 /HPF

## 2021-08-20 NOTE — ED PROVIDER NOTE - NO SIGNIFICANT PAST SURGICAL HISTORY
<<----- Click to add NO significant Past Surgical History Drysol Counseling:  I discussed with the patient the risks of drysol/aluminum chloride including but not limited to skin rash, itching, irritation, burning.

## 2021-11-02 ENCOUNTER — APPOINTMENT (OUTPATIENT)
Dept: UROLOGY | Facility: CLINIC | Age: 71
End: 2021-11-02

## 2021-12-02 ENCOUNTER — RX RENEWAL (OUTPATIENT)
Age: 71
End: 2021-12-02

## 2022-01-10 ENCOUNTER — APPOINTMENT (OUTPATIENT)
Dept: GASTROENTEROLOGY | Facility: CLINIC | Age: 72
End: 2022-01-10
Payer: COMMERCIAL

## 2022-01-10 VITALS
TEMPERATURE: 97.7 F | WEIGHT: 193 LBS | HEIGHT: 66 IN | OXYGEN SATURATION: 97 % | SYSTOLIC BLOOD PRESSURE: 125 MMHG | BODY MASS INDEX: 31.02 KG/M2 | DIASTOLIC BLOOD PRESSURE: 79 MMHG | HEART RATE: 59 BPM

## 2022-01-10 DIAGNOSIS — E73.9 LACTOSE INTOLERANCE, UNSPECIFIED: ICD-10-CM

## 2022-01-10 DIAGNOSIS — K29.70 GASTRITIS, UNSPECIFIED, W/OUT BLEEDING: ICD-10-CM

## 2022-01-10 DIAGNOSIS — K52.9 NONINFECTIVE GASTROENTERITIS AND COLITIS, UNSPECIFIED: ICD-10-CM

## 2022-01-10 DIAGNOSIS — B96.81 GASTRITIS, UNSPECIFIED, W/OUT BLEEDING: ICD-10-CM

## 2022-01-10 DIAGNOSIS — K27.9 PEPTIC ULCER, SITE UNSPECIFIED, UNSPECIFIED AS ACUTE OR CHRONIC, W/OUT HEMORRHAGE OR PERFORATION: ICD-10-CM

## 2022-01-10 PROCEDURE — 99205 OFFICE O/P NEW HI 60 MIN: CPT

## 2022-01-10 NOTE — CONSULT LETTER
[Dear  ___] : Dear  [unfilled], [Consult Letter:] : I had the pleasure of evaluating your patient, [unfilled]. [( Thank you for referring [unfilled] for consultation for _____ )] : Thank you for referring [unfilled] for consultation for [unfilled] [Please see my note below.] : Please see my note below. [Consult Closing:] : Thank you very much for allowing me to participate in the care of this patient.  If you have any questions, please do not hesitate to contact me. [Sincerely,] : Sincerely, [FreeTextEntry2] : Dr. Ra Coley. [FreeTextEntry3] : Denzel Weber MD

## 2022-01-10 NOTE — HISTORY OF PRESENT ILLNESS
[FreeTextEntry1] : Office consultation on 1/10/2021.\par \par The patient is a 71-year-old man evaluated with complaint of diarrhea of 3 to 4 years duration.  PMD: Dr. Ra Coley.\par \par Patient's chief complaint is diarrhea of 3 to 4 years duration.\par \par Patient's bowel habit characterized as infrequent bowel movements of many years duration.  In the past he recalls his typical bowel movement characterized as a formed [place] 4 bowel movements twice per week.\par \par The patient underwent an open cholecystectomy in .  Ever since then he has had increased flatus.  In addition, following laparoscopic cholecystectomy he observed more softer mushy stools with passage of a [place] 5 bowel movement but still twice per week.\par \par Has longstanding history of lactose intolerance with milk products triggering bloating and gas.\par \par Current complaint of diarrhea has been for 3 to 4 years.  Precipitating factors at that time not reported.  Of note, he describes his episodes of diarrhea as passage of a single watery unformed diarrheal [place] 7 bowel movement once weekly.  As noted, despite his complaint of watery diarrhea of 3 to 4 years duration he still only has 1 bowel movement per week.\par \par For the past 1-1/2-year he describes complaint of "no feeling of movement".  Seems to imply not feeling urge to move his bowels.\par \par Previously evaluated by gastroenterology regarding current symptoms.  Had colonoscopy in 2019 at which time colon polyp removed.  Await report for review.  Not clear if biopsies taken to evaluate for microscopic colitis.  Because of complaint of "no feeling of movement" with infrequent bowel movements patient indicates he is on a regimen of Linzess that he takes approximately once per week.  Does not know dose.  Complaint of diarrhea with watery bowel movements albeit once weekly preceded starting Linzess.\par \par Indicates taking a sugar substitute that may contain sorbitol.\par \par Indicates having previously tried psyllium for 1 month which he does not feel helped.\par \par Intermittently can observe scant red blood on toilet paper only after moving his bowels of many years duration.  No increased rectal bleeding reported.\par \par As noted, for the past 3 to 4 years he has complained of diarrhea described as passage of a watery [place] 7 bowel movement once per week.  On days he has diarrhea he only has 1 diarrheal stool.  During the other 6 days he has no bowel movement.  Does not feel urge to move his bowels.  Denies experiencing abdominal pain in association with diarrhea episodes.  However, does complain of excessive gas and flatus.\par \par Patient was told of having IBS sometime in the .\par \par Patient indicates treatment for an upper GI bleed in  attributed to peptic ulcer disease and he describes being treated for Helicobacter pylori at that time.  Does not indicate whether successful eradication was confirmed.\par \par Denies fever.  Appetite stable.  Has gained 10 pounds over the past few years.  Denies complaints of oral ulcers, dysphagia, heartburn, nausea, vomiting or any abdominal pain.  Does complain of excessive gas manifesting as increased flatus.\par \par Family history of colon cancer reported with brother  age 69.  A maternal uncle also had colon cancer.  Sister decreased from gallbladder cancer in her 70s.

## 2022-01-10 NOTE — PHYSICAL EXAM
[General Appearance - Alert] : alert [General Appearance - In No Acute Distress] : in no acute distress [General Appearance - Well Developed] : well developed [General Appearance - Well-Appearing] : healthy appearing [Sclera] : the sclera and conjunctiva were normal [Neck Cervical Mass (___cm)] : no neck mass was observed [Respiration, Rhythm And Depth] : normal respiratory rhythm and effort [Exaggerated Use Of Accessory Muscles For Inspiration] : no accessory muscle use [Auscultation Breath Sounds / Voice Sounds] : lungs were clear to auscultation bilaterally [Heart Rate And Rhythm] : heart rate was normal and rhythm regular [Heart Sounds] : normal S1 and S2 [Heart Sounds Gallop] : no gallops [Murmurs] : no murmurs [Heart Sounds Pericardial Friction Rub] : no pericardial rub [Edema] : there was no peripheral edema [Bowel Sounds] : normal bowel sounds [Abdomen Soft] : soft [Abdomen Tenderness] : non-tender [] : no hepato-splenomegaly [Abdomen Mass (___ Cm)] : no abdominal mass palpated [Abdomen Hernia] : no hernia was discovered [Normal Sphincter Tone] : normal sphincter tone [No Rectal Mass] : no rectal mass [External Hemorrhoid] : external hemorrhoids [Cervical Lymph Nodes Enlarged Posterior Bilaterally] : posterior cervical [Cervical Lymph Nodes Enlarged Anterior Bilaterally] : anterior cervical [Supraclavicular Lymph Nodes Enlarged Bilaterally] : supraclavicular [No CVA Tenderness] : no ~M costovertebral angle tenderness [Abnormal Walk] : normal gait [Nail Clubbing] : no clubbing  or cyanosis of the fingernails [Skin Color & Pigmentation] : normal skin color and pigmentation [Oriented To Time, Place, And Person] : oriented to person, place, and time [Impaired Insight] : insight and judgment were intact [Affect] : the affect was normal [Mood] : the mood was normal [FreeTextEntry1] : Hemorrhoid noted.  Normal sphincter tone.  No mass.  Rectal vault empty.  Blood not detected.

## 2022-01-10 NOTE — ASSESSMENT
[FreeTextEntry1] : Impression:\par \par #1 chronic diarrhea–somewhat paradoxical history of complaint of watery diarrhea of 3 to 4 years duration but with episodes of diarrhea only once per week in association with infrequent bowel movements associated with decreased feeling of urge to evacuate.  Stool consistency change noted following open cholecystectomy and possible component of bile acid diarrhea is considered.  Current diarrhea exacerbated by Linzess.  Of note, although subjectively reporting infrequent bowel movements once per week and "no feeling of movement" he does not subjectively describe himself as feeling constipated.  Diarrhea possibly watery diarrhea of osmotic etiology triggered by lactose products or other dietary sensitivity possibly including sorbitol ingestion.\par \par #2 lactose intolerance–milk products predictably trigger gas and excessive flatus.\par \par #3 history colonic polyp–patient reports colonic polyp removed at colonoscopy of 2019.\par \par #4 family history colon cancer–affecting first-degree relative (patient's brother age 69) and a second-degree relative (maternal uncle) which poses an increased risk of colorectal neoplasia.\par \par #5 status post open cholecystectomy 1980s.\par \par #6 obesity–BMI 31.15.\par \par #7 history upper GI bleed from PUD approximately 1990–treated for Helicobacter pylori.\par \par General medical problems:\par \par -Hypertension.\par \par -BPH.\par \par -Thyroid nodules.\par \par -Gout.\par \par -Status post anterior cervical spine fusion 2013 and status post posterior cervical thoracic spine fusion 2019.\par \par \par \par \par \par

## 2022-01-11 ENCOUNTER — APPOINTMENT (OUTPATIENT)
Dept: NEPHROLOGY | Facility: CLINIC | Age: 72
End: 2022-01-11
Payer: COMMERCIAL

## 2022-01-11 VITALS
HEIGHT: 66 IN | BODY MASS INDEX: 31.18 KG/M2 | OXYGEN SATURATION: 95 % | TEMPERATURE: 97.4 F | HEART RATE: 58 BPM | DIASTOLIC BLOOD PRESSURE: 75 MMHG | WEIGHT: 194 LBS | SYSTOLIC BLOOD PRESSURE: 131 MMHG

## 2022-01-11 LAB
ALBUMIN SERPL ELPH-MCNC: 4.6 G/DL
ALP BLD-CCNC: 91 U/L
ALT SERPL-CCNC: 20 U/L
ANION GAP SERPL CALC-SCNC: 13 MMOL/L
AST SERPL-CCNC: 14 U/L
BASOPHILS # BLD AUTO: 0.03 K/UL
BASOPHILS NFR BLD AUTO: 0.3 %
BILIRUB SERPL-MCNC: 0.4 MG/DL
BUN SERPL-MCNC: 22 MG/DL
CALCIUM SERPL-MCNC: 9.7 MG/DL
CHLORIDE SERPL-SCNC: 106 MMOL/L
CO2 SERPL-SCNC: 23 MMOL/L
CREAT SERPL-MCNC: 1.39 MG/DL
CRP SERPL-MCNC: <3 MG/L
EOSINOPHIL # BLD AUTO: 0.25 K/UL
EOSINOPHIL NFR BLD AUTO: 2.6 %
GLUCOSE SERPL-MCNC: 90 MG/DL
HCT VFR BLD CALC: 46.4 %
HGB BLD-MCNC: 14.8 G/DL
IMM GRANULOCYTES NFR BLD AUTO: 0.4 %
LYMPHOCYTES # BLD AUTO: 2.58 K/UL
LYMPHOCYTES NFR BLD AUTO: 26.7 %
MAN DIFF?: NORMAL
MCHC RBC-ENTMCNC: 29.4 PG
MCHC RBC-ENTMCNC: 31.9 GM/DL
MCV RBC AUTO: 92.2 FL
MONOCYTES # BLD AUTO: 0.63 K/UL
MONOCYTES NFR BLD AUTO: 6.5 %
NEUTROPHILS # BLD AUTO: 6.14 K/UL
NEUTROPHILS NFR BLD AUTO: 63.5 %
PLATELET # BLD AUTO: 197 K/UL
POTASSIUM SERPL-SCNC: 4.7 MMOL/L
PROT SERPL-MCNC: 7.5 G/DL
RBC # BLD: 5.03 M/UL
RBC # FLD: 14.2 %
SODIUM SERPL-SCNC: 142 MMOL/L
T4 FREE SERPL-MCNC: 1.2 NG/DL
TSH SERPL-ACNC: 2.62 UIU/ML
VIT B12 SERPL-MCNC: 445 PG/ML
WBC # FLD AUTO: 9.67 K/UL

## 2022-01-11 PROCEDURE — 99214 OFFICE O/P EST MOD 30 MIN: CPT | Mod: GC

## 2022-01-11 NOTE — PHYSICAL EXAM
[General Appearance - Alert] : alert [General Appearance - In No Acute Distress] : in no acute distress [General Appearance - Well Nourished] : well nourished [Neck Cervical Mass (___cm)] : no neck mass was observed [Jugular Venous Distention Increased] : there was no jugular-venous distention [Respiration, Rhythm And Depth] : normal respiratory rhythm and effort [Exaggerated Use Of Accessory Muscles For Inspiration] : no accessory muscle use [Auscultation Breath Sounds / Voice Sounds] : lungs were clear to auscultation bilaterally [Abdomen Soft] : soft [Abdomen Tenderness] : non-tender [] : no hepato-splenomegaly [No CVA Tenderness] : no ~M costovertebral angle tenderness [No Spinal Tenderness] : no spinal tenderness [Oriented To Time, Place, And Person] : oriented to person, place, and time

## 2022-01-11 NOTE — REVIEW OF SYSTEMS
[Negative] : Musculoskeletal [Fever] : no fever [Chills] : no chills [Feeling Poorly] : not feeling poorly [Feeling Tired] : not feeling tired

## 2022-01-11 NOTE — ASSESSMENT
[FreeTextEntry1] : \par Mr. SOLIZ is a 71 year old male with  origin and chronic hx of HTN and hematuria and now CKD over the last few years with worsening proteinuria. IgA Nephropathy is highest on the ddx with other chronic interstitial diseases vs Thin basement membrane likely. FSGS might be seen but more secondary here.\par Overall, this is progressive CKD and a kidney bx may help more for diagnostic and prognostic reasons\par Data on treatment with steroids for IgA nephropathy is weak especially if has had significant scarring on renal bx.\par \par IgA Neph with CKD- no bx for now as pt would defer\par started on ARB + SGLT2i combo for now, has remained stbale\par Urine protein from 8/2020 at 1gm. \par Will check today renal panel and urine work up. \par Check COVID ab titers\par Continue current management, avoid NSAIDS. \par \par f/.u 3 months.

## 2022-01-12 LAB
ALBUMIN SERPL ELPH-MCNC: 4.6 G/DL
ANION GAP SERPL CALC-SCNC: 12 MMOL/L
APPEARANCE: CLEAR
BACTERIA: NEGATIVE
BASOPHILS # BLD AUTO: 0.04 K/UL
BASOPHILS NFR BLD AUTO: 0.4 %
BILIRUBIN URINE: NEGATIVE
BLOOD URINE: ABNORMAL
BUN SERPL-MCNC: 21 MG/DL
CALCIUM SERPL-MCNC: 9.6 MG/DL
CHLORIDE SERPL-SCNC: 106 MMOL/L
CO2 SERPL-SCNC: 24 MMOL/L
COLOR: NORMAL
COVID-19 SPIKE DOMAIN ANTIBODY INTERPRETATION: POSITIVE
CREAT SERPL-MCNC: 1.39 MG/DL
CREAT SPEC-SCNC: 75 MG/DL
CREAT/PROT UR: 0.9 RATIO
EOSINOPHIL # BLD AUTO: 0.19 K/UL
EOSINOPHIL NFR BLD AUTO: 2 %
ESTIMATED AVERAGE GLUCOSE: 117 MG/DL
GLUCOSE QUALITATIVE U: ABNORMAL
GLUCOSE SERPL-MCNC: 90 MG/DL
HBA1C MFR BLD HPLC: 5.7 %
HCT VFR BLD CALC: 46.6 %
HGB BLD-MCNC: 14.7 G/DL
HYALINE CASTS: 0 /LPF
IGA SER QL IEP: 433 MG/DL
IMM GRANULOCYTES NFR BLD AUTO: 0.4 %
KETONES URINE: NEGATIVE
LEUKOCYTE ESTERASE URINE: NEGATIVE
LYMPHOCYTES # BLD AUTO: 2.56 K/UL
LYMPHOCYTES NFR BLD AUTO: 27.5 %
MAN DIFF?: NORMAL
MCHC RBC-ENTMCNC: 29.4 PG
MCHC RBC-ENTMCNC: 31.5 GM/DL
MCV RBC AUTO: 93.2 FL
MICROSCOPIC-UA: NORMAL
MONOCYTES # BLD AUTO: 0.57 K/UL
MONOCYTES NFR BLD AUTO: 6.1 %
NEUTROPHILS # BLD AUTO: 5.9 K/UL
NEUTROPHILS NFR BLD AUTO: 63.6 %
NITRITE URINE: NEGATIVE
PH URINE: 6
PHOSPHATE SERPL-MCNC: 3.4 MG/DL
PLATELET # BLD AUTO: 197 K/UL
POTASSIUM SERPL-SCNC: 4.5 MMOL/L
PROT UR-MCNC: 71 MG/DL
PROTEIN URINE: ABNORMAL
PSA FREE FLD-MCNC: 29 %
PSA FREE SERPL-MCNC: 0.11 NG/ML
PSA SERPL-MCNC: 0.37 NG/ML
RBC # BLD: 5 M/UL
RBC # FLD: 14.3 %
RED BLOOD CELLS URINE: 1 /HPF
SARS-COV-2 AB SERPL IA-ACNC: >250 U/ML
SODIUM SERPL-SCNC: 143 MMOL/L
SPECIFIC GRAVITY URINE: 1.02
SQUAMOUS EPITHELIAL CELLS: 0 /HPF
TTG IGA SER IA-ACNC: <1.2 U/ML
TTG IGA SER-ACNC: NEGATIVE
UROBILINOGEN URINE: NORMAL
WBC # FLD AUTO: 9.3 K/UL
WHITE BLOOD CELLS URINE: 0 /HPF

## 2022-01-13 LAB — UREA BREATH TEST QL: NEGATIVE

## 2022-06-06 ENCOUNTER — RX RENEWAL (OUTPATIENT)
Age: 72
End: 2022-06-06

## 2022-08-07 ENCOUNTER — NON-APPOINTMENT (OUTPATIENT)
Age: 72
End: 2022-08-07

## 2022-08-08 ENCOUNTER — APPOINTMENT (OUTPATIENT)
Dept: FAMILY MEDICINE | Facility: CLINIC | Age: 72
End: 2022-08-08

## 2022-08-08 ENCOUNTER — NON-APPOINTMENT (OUTPATIENT)
Age: 72
End: 2022-08-08

## 2022-08-08 VITALS
HEIGHT: 66.14 IN | SYSTOLIC BLOOD PRESSURE: 112 MMHG | BODY MASS INDEX: 29.89 KG/M2 | HEART RATE: 55 BPM | TEMPERATURE: 97.2 F | WEIGHT: 186 LBS | DIASTOLIC BLOOD PRESSURE: 70 MMHG | OXYGEN SATURATION: 97 %

## 2022-08-08 PROCEDURE — 99397 PER PM REEVAL EST PAT 65+ YR: CPT | Mod: 25

## 2022-08-08 PROCEDURE — 93000 ELECTROCARDIOGRAM COMPLETE: CPT

## 2022-08-08 RX ORDER — OLOPATADINE HYDROCHLORIDE 2 MG/ML
0.2 SOLUTION OPHTHALMIC
Qty: 2 | Refills: 0 | Status: COMPLETED | COMMUNITY
Start: 2021-07-26

## 2022-08-08 RX ORDER — NEBIVOLOL 20 MG/1
20 TABLET ORAL
Refills: 0 | Status: COMPLETED | COMMUNITY

## 2022-08-08 RX ORDER — ALLOPURINOL 100 MG/1
100 TABLET ORAL DAILY
Qty: 90 | Refills: 1 | Status: COMPLETED | COMMUNITY
Start: 2019-07-24 | End: 2022-08-08

## 2022-08-08 NOTE — HEALTH RISK ASSESSMENT
[Patient reported colonoscopy was normal] : Patient reported colonoscopy was normal [ColonoscopyDate] : 9/19

## 2022-08-08 NOTE — PHYSICAL EXAM
[Normal] : affect was normal and insight and judgment were intact [de-identified] : b/l SCM hypertrophy, cervical spine surgery scarring. Limited ROM

## 2022-08-08 NOTE — HISTORY OF PRESENT ILLNESS
[FreeTextEntry1] : annual/establish care [de-identified] : 70 yo M with hx IgA nephropathy, multiple thyroid nodules, pre-DM, gout.\par \par multiple thyroid nodules-- follows with outside endo. Had labs done yesterday at [facility]. Of note, only TSH 4.88, T3 WNL. No free T4.\par \par a1c 5.7\par lipids ok.\par PSA normal.\par \par Had echo-- 5/27/22-- EF 60-65%, grade 1 diastolic dysfunction. Aortic root mildly dilated 4.4cm. Hx RBBB for past 30 years.\par \par prevnar 20 done June this year at [facility].

## 2022-08-15 RX ORDER — FINASTERIDE 5 MG/1
5 TABLET, FILM COATED ORAL DAILY
Qty: 90 | Refills: 3 | Status: ACTIVE | COMMUNITY
Start: 2017-05-24 | End: 1900-01-01

## 2022-08-15 RX ORDER — ERGOCALCIFEROL 1.25 MG/1
1.25 MG CAPSULE, LIQUID FILLED ORAL
Qty: 12 | Refills: 2 | Status: COMPLETED | COMMUNITY
Start: 2022-06-06 | End: 2022-08-15

## 2022-08-18 ENCOUNTER — RX RENEWAL (OUTPATIENT)
Age: 72
End: 2022-08-18

## 2023-01-04 DIAGNOSIS — Z12.5 ENCOUNTER FOR SCREENING FOR MALIGNANT NEOPLASM OF PROSTATE: ICD-10-CM

## 2023-01-05 ENCOUNTER — NON-APPOINTMENT (OUTPATIENT)
Age: 73
End: 2023-01-05

## 2023-01-06 ENCOUNTER — APPOINTMENT (OUTPATIENT)
Dept: NEPHROLOGY | Facility: CLINIC | Age: 73
End: 2023-01-06

## 2023-01-06 LAB — TSH SERPL-ACNC: 2.7 UIU/ML

## 2023-01-07 ENCOUNTER — APPOINTMENT (OUTPATIENT)
Dept: ULTRASOUND IMAGING | Facility: CLINIC | Age: 73
End: 2023-01-07
Payer: COMMERCIAL

## 2023-01-07 PROCEDURE — 76770 US EXAM ABDO BACK WALL COMP: CPT

## 2023-01-09 ENCOUNTER — NON-APPOINTMENT (OUTPATIENT)
Age: 73
End: 2023-01-09

## 2023-01-09 LAB
ALBUMIN SERPL ELPH-MCNC: 4.2 G/DL
ANION GAP SERPL CALC-SCNC: 10 MMOL/L
APPEARANCE: CLEAR
BACTERIA: NEGATIVE
BASOPHILS # BLD AUTO: 0.03 K/UL
BASOPHILS NFR BLD AUTO: 0.3 %
BILIRUBIN URINE: NEGATIVE
BLOOD URINE: ABNORMAL
BUN SERPL-MCNC: 21 MG/DL
CALCIUM SERPL-MCNC: 9.8 MG/DL
CALCIUM SERPL-MCNC: 9.8 MG/DL
CHLORIDE SERPL-SCNC: 104 MMOL/L
CHOLEST SERPL-MCNC: 107 MG/DL
CO2 SERPL-SCNC: 28 MMOL/L
COLOR: YELLOW
CREAT SERPL-MCNC: 1.44 MG/DL
CREAT SPEC-SCNC: 199 MG/DL
CREAT/PROT UR: 1.1 RATIO
EGFR: 52 ML/MIN/1.73M2
EOSINOPHIL # BLD AUTO: 0.1 K/UL
EOSINOPHIL NFR BLD AUTO: 1.1 %
ESTIMATED AVERAGE GLUCOSE: 120 MG/DL
FERRITIN SERPL-MCNC: 109 NG/ML
GLUCOSE QUALITATIVE U: ABNORMAL
GLUCOSE SERPL-MCNC: 102 MG/DL
HBA1C MFR BLD HPLC: 5.8 %
HCT VFR BLD CALC: 49.7 %
HDLC SERPL-MCNC: 39 MG/DL
HGB BLD-MCNC: 15.7 G/DL
HYALINE CASTS: 0 /LPF
IMM GRANULOCYTES NFR BLD AUTO: 0.2 %
IRON SATN MFR SERPL: 20 %
IRON SERPL-MCNC: 67 UG/DL
KETONES URINE: NEGATIVE
LDLC SERPL CALC-MCNC: 44 MG/DL
LEUKOCYTE ESTERASE URINE: NEGATIVE
LYMPHOCYTES # BLD AUTO: 2.06 K/UL
LYMPHOCYTES NFR BLD AUTO: 23.7 %
MAN DIFF?: NORMAL
MCHC RBC-ENTMCNC: 29.1 PG
MCHC RBC-ENTMCNC: 31.6 GM/DL
MCV RBC AUTO: 92.2 FL
MICROSCOPIC-UA: NORMAL
MONOCYTES # BLD AUTO: 0.71 K/UL
MONOCYTES NFR BLD AUTO: 8.2 %
NEUTROPHILS # BLD AUTO: 5.79 K/UL
NEUTROPHILS NFR BLD AUTO: 66.5 %
NITRITE URINE: NEGATIVE
NONHDLC SERPL-MCNC: 68 MG/DL
PARATHYROID HORMONE INTACT: 52 PG/ML
PH URINE: 6
PHOSPHATE SERPL-MCNC: 3.8 MG/DL
PLATELET # BLD AUTO: 175 K/UL
POTASSIUM SERPL-SCNC: 4.5 MMOL/L
PROT UR-MCNC: 212 MG/DL
PROTEIN URINE: ABNORMAL
PSA SERPL-MCNC: 0.76 NG/ML
RBC # BLD: 5.39 M/UL
RBC # FLD: 14.9 %
RED BLOOD CELLS URINE: 5 /HPF
SODIUM SERPL-SCNC: 141 MMOL/L
SPECIFIC GRAVITY URINE: 1.02
SQUAMOUS EPITHELIAL CELLS: 2 /HPF
TIBC SERPL-MCNC: 329 UG/DL
TRIGL SERPL-MCNC: 122 MG/DL
TSH SERPL-ACNC: 2.7 UIU/ML
UIBC SERPL-MCNC: 262 UG/DL
URATE SERPL-MCNC: 4.8 MG/DL
UROBILINOGEN URINE: NORMAL
WBC # FLD AUTO: 8.71 K/UL
WHITE BLOOD CELLS URINE: 6 /HPF

## 2023-01-18 ENCOUNTER — APPOINTMENT (OUTPATIENT)
Dept: FAMILY MEDICINE | Facility: CLINIC | Age: 73
End: 2023-01-18
Payer: COMMERCIAL

## 2023-01-18 VITALS
OXYGEN SATURATION: 99 % | BODY MASS INDEX: 29.41 KG/M2 | WEIGHT: 183 LBS | SYSTOLIC BLOOD PRESSURE: 146 MMHG | HEIGHT: 66.14 IN | HEART RATE: 59 BPM | DIASTOLIC BLOOD PRESSURE: 80 MMHG | TEMPERATURE: 97.9 F

## 2023-01-18 VITALS — SYSTOLIC BLOOD PRESSURE: 130 MMHG | DIASTOLIC BLOOD PRESSURE: 80 MMHG

## 2023-01-18 PROCEDURE — 99214 OFFICE O/P EST MOD 30 MIN: CPT

## 2023-01-18 NOTE — HISTORY OF PRESENT ILLNESS
[FreeTextEntry1] : f/u HTN, CKD/labs discussion [de-identified] : 73 yo M with HTN, CKD, pre-DM, IBS, BPH presents for f/u. BPs have been fluctuating. Did have BPs up to 150s/100s. Was taking extra losartan. Pt was advised to take amlodipine instead of losartan. CKD labs checked by nephrology-- stable. BPs have improved but does on occasion increase to 150s in the evenings. Advised when it does to take amlodipine 5mg instead of half losartan. A1c has been stable. + proteinuria.

## 2023-01-25 ENCOUNTER — APPOINTMENT (OUTPATIENT)
Dept: NEPHROLOGY | Facility: CLINIC | Age: 73
End: 2023-01-25
Payer: COMMERCIAL

## 2023-01-25 VITALS — DIASTOLIC BLOOD PRESSURE: 90 MMHG | SYSTOLIC BLOOD PRESSURE: 130 MMHG

## 2023-01-25 PROCEDURE — 99212 OFFICE O/P EST SF 10 MIN: CPT | Mod: 95

## 2023-01-25 NOTE — HISTORY OF PRESENT ILLNESS
[Home] : at home, [unfilled] , at the time of the visit. [Medical Office: ([facility])___] : at the medical office located in  [Verbal consent obtained from patient] : the patient, [unfilled] [FreeTextEntry1] : Mr. SOLIZ is a 72 year old male with hx of hematuria for years and CKD with crt in 1.6 range here for a follow up. He has had hematuria dating back to 2004. His crt was normal in 2004 at 1.0mg/dl and since 2018 onwards crt started to rise in 1.3 range and then in 2019 was 1.6. He had cysto done in 2019 that was neg. He was seen by Nephrologist in [facility] for a cards clearance and was stable. Since then has followed with a nephrologist in [facility] Dr Sainz and crt in 1.6mg/dl range. He also has non obstructive BPH, longstanding HTN on beta blockers only. He used to be on ACEi, not sure when that was stopped and why.\par In significant pain thoracic spine, and very little neck mobility, significant impairment in his quality of life post May cervical/thoracic spinal surgery. Doesn't like to leave his apt because of the pain and limited mobility. Voice also changed the thinks 2/2 intubation. Was taking NSAID in the past. Strong FH of cancer but no kidney disease. Primary Nephrologist concerned re IgA Nephropathy. Recently labs also notable for 1.6gm of proteinuria on a 24 hour collection. He has never been biopsied. Was started on ARB and SGLT2. \par \par Since last visit, he is doing well, currently no acute complaints. His bp was high in Jan 2023 early and called office. now on CCB as well. BP better in 120-130SBP at home. Feels well\par Crt stable at 1.4 and protenuria unchanged at 1gm. He is tolerateing SGLT2i welll.

## 2023-01-25 NOTE — REVIEW OF SYSTEMS
[Fever] : no fever [Chills] : no chills [Feeling Poorly] : not feeling poorly [Feeling Tired] : not feeling tired [Negative] : Musculoskeletal

## 2023-01-25 NOTE — ASSESSMENT
[FreeTextEntry1] : Mr. SOLIZ is a 72 year old male with  origin and chronic hx of HTN and hematuria and now CKD over the last few years with worsening proteinuria. IgA Nephropathy is highest on the ddx with other chronic interstitial diseases vs Thin basement membrane likely. FSGS might be seen but more secondary here.\par Overall, this is progressive CKD and a kidney bx may help more for diagnostic and prognostic reasons\par \par IgA Neph with CKD- no bx for now as pt wants to decide eventually he wants or not\par Explained to him that he can benefit from clinical trial ongoing for igAN -- he will decide by May 2023\par started on ARB + SGLT2i combo for now, has remained stable. Doesn't want to increase losartan to 50mg for now and keep 25mg dose\par Continue current management, avoid NSAIDS. \par \par f/.u 3-4 months virtually

## 2023-05-03 ENCOUNTER — NON-APPOINTMENT (OUTPATIENT)
Age: 73
End: 2023-05-03

## 2023-05-04 DIAGNOSIS — R73.03 PREDIABETES.: ICD-10-CM

## 2023-05-04 DIAGNOSIS — Z00.00 ENCOUNTER FOR GENERAL ADULT MEDICAL EXAMINATION W/OUT ABNORMAL FINDINGS: ICD-10-CM

## 2023-05-04 DIAGNOSIS — E55.9 VITAMIN D DEFICIENCY, UNSPECIFIED: ICD-10-CM

## 2023-05-31 ENCOUNTER — RX RENEWAL (OUTPATIENT)
Age: 73
End: 2023-05-31

## 2023-06-06 ENCOUNTER — APPOINTMENT (OUTPATIENT)
Dept: CT IMAGING | Facility: CLINIC | Age: 73
End: 2023-06-06
Payer: COMMERCIAL

## 2023-06-06 ENCOUNTER — OUTPATIENT (OUTPATIENT)
Dept: OUTPATIENT SERVICES | Facility: HOSPITAL | Age: 73
LOS: 1 days | End: 2023-06-06
Payer: COMMERCIAL

## 2023-06-06 DIAGNOSIS — Z00.8 ENCOUNTER FOR OTHER GENERAL EXAMINATION: ICD-10-CM

## 2023-06-06 LAB
CREAT SPEC-SCNC: 85 MG/DL
CREAT/PROT UR: 1 RATIO
PROT UR-MCNC: 82 MG/DL

## 2023-06-06 PROCEDURE — 70450 CT HEAD/BRAIN W/O DYE: CPT | Mod: 26

## 2023-06-06 PROCEDURE — 70450 CT HEAD/BRAIN W/O DYE: CPT

## 2023-06-07 LAB
ALBUMIN SERPL ELPH-MCNC: 4.6 G/DL
ANION GAP SERPL CALC-SCNC: 16 MMOL/L
APPEARANCE: CLEAR
BACTERIA: NEGATIVE /HPF
BILIRUBIN URINE: NEGATIVE
BLOOD URINE: ABNORMAL
BUN SERPL-MCNC: 20 MG/DL
CALCIUM SERPL-MCNC: 10.2 MG/DL
CAST: 0 /LPF
CHLORIDE SERPL-SCNC: 106 MMOL/L
CO2 SERPL-SCNC: 22 MMOL/L
COLOR: YELLOW
CREAT SERPL-MCNC: 1.22 MG/DL
EGFR: 63 ML/MIN/1.73M2
EPITHELIAL CELLS: 0 /HPF
GLUCOSE QUALITATIVE U: >=1000 MG/DL
GLUCOSE SERPL-MCNC: 96 MG/DL
KETONES URINE: NEGATIVE MG/DL
LEUKOCYTE ESTERASE URINE: NEGATIVE
MICROSCOPIC-UA: NORMAL
NITRITE URINE: NEGATIVE
PH URINE: 5.5
PHOSPHATE SERPL-MCNC: 4 MG/DL
POTASSIUM SERPL-SCNC: 4.8 MMOL/L
PROTEIN URINE: 100 MG/DL
RED BLOOD CELLS URINE: 2 /HPF
REVIEW: NORMAL
SODIUM SERPL-SCNC: 143 MMOL/L
SPECIFIC GRAVITY URINE: 1.02
UROBILINOGEN URINE: 0.2 MG/DL
WHITE BLOOD CELLS URINE: 0 /HPF

## 2023-06-09 ENCOUNTER — RX RENEWAL (OUTPATIENT)
Age: 73
End: 2023-06-09

## 2023-06-12 ENCOUNTER — RX RENEWAL (OUTPATIENT)
Age: 73
End: 2023-06-12

## 2023-06-20 ENCOUNTER — NON-APPOINTMENT (OUTPATIENT)
Age: 73
End: 2023-06-20

## 2023-06-21 ENCOUNTER — APPOINTMENT (OUTPATIENT)
Dept: NEPHROLOGY | Facility: CLINIC | Age: 73
End: 2023-06-21
Payer: COMMERCIAL

## 2023-06-21 VITALS — WEIGHT: 180 LBS | BODY MASS INDEX: 28.93 KG/M2

## 2023-06-21 VITALS — DIASTOLIC BLOOD PRESSURE: 80 MMHG | SYSTOLIC BLOOD PRESSURE: 120 MMHG

## 2023-06-21 PROCEDURE — 99213 OFFICE O/P EST LOW 20 MIN: CPT | Mod: 95

## 2023-06-21 NOTE — HISTORY OF PRESENT ILLNESS
[FreeTextEntry1] : Mr. SOLIZ is a 72 year old male with hx of hematuria for years and CKD with crt in 1.6 range here for a follow up. He has had hematuria dating back to 2004. His crt was normal in 2004 at 1.0mg/dl and since 2018 onwards crt started to rise in 1.3 range and then in 2019 was 1.6. He had cysto done in 2019 that was neg. He was seen by Nephrologist in [facility] for a cards clearance and was stable. Since then has followed with a nephrologist in [facility] Dr Sainz and crt in 1.6mg/dl range. He also has non obstructive BPH, longstanding HTN on beta blockers only. He used to be on ACEi, not sure when that was stopped and why.\par In significant pain thoracic spine, and very little neck mobility, significant impairment in his quality of life post May cervical/thoracic spinal surgery. Doesn't like to leave his apt because of the pain and limited mobility. Voice also changed the thinks 2/2 intubation. Was taking NSAID in the past. Strong FH of cancer but no kidney disease. Primary Nephrologist concerned re IgA Nephropathy. Recently labs also notable for 1.6gm of proteinuria on a 24 hour collection. He has never been biopsied. Was started on ARB and SGLT2. \par \par Since last visit, he is doing well, currently no acute complaints. Since last viist, on losartan 25mg and farxiga 10mg and crt is now 1,2 but proteinuria still 1gm. He feels well. Home bp readings in 120-130 SBp range [Home] : at home, [unfilled] , at the time of the visit. [Medical Office: ([facility])___] : at the medical office located in  [Verbal consent obtained from patient] : the patient, [unfilled]

## 2023-06-21 NOTE — ASSESSMENT
[FreeTextEntry1] : Mr. SOLIZ is a 72 year old male with  origin and chronic hx of HTN and hematuria and now CKD over the last few years with worsening proteinuria. IgA Nephropathy is highest on the ddx with other chronic interstitial diseases vs Thin basement membrane likely. FSGS might be seen but more secondary here.\par Overall, this is progressive CKD and a kidney bx may help more for diagnostic and prognostic reasons\par \par IgA Neph with CKD- no bx for now as pt wants to decide eventually he wants or not\par Explained to him that he can benefit from clinical trial ongoing for igAN -- he will decide eventually for now doesn't want it\par started on ARB + SGLT2i combo for now, has remained stable.\par Given ongoing proteinuria, increase losartan to 50mg qd and may have to come off norvasc ( he will check at home and come off if SBP<110 and or dizzy) \par Labs in 1 month\par \par f/.u 3-4 months virtually

## 2023-06-21 NOTE — REASON FOR VISIT
[Follow-Up] : a follow-up visit [Home] : at home, [unfilled] , at the time of the visit. [Medical Office: ([facility])___] : at the medical office located in  [Patient] : the patient

## 2023-07-10 ENCOUNTER — APPOINTMENT (OUTPATIENT)
Dept: GASTROENTEROLOGY | Facility: CLINIC | Age: 73
End: 2023-07-10
Payer: COMMERCIAL

## 2023-07-10 VITALS
OXYGEN SATURATION: 98 % | SYSTOLIC BLOOD PRESSURE: 132 MMHG | WEIGHT: 180 LBS | HEIGHT: 66.14 IN | DIASTOLIC BLOOD PRESSURE: 82 MMHG | HEART RATE: 55 BPM | BODY MASS INDEX: 28.93 KG/M2 | TEMPERATURE: 97.6 F

## 2023-07-10 DIAGNOSIS — Z12.11 ENCOUNTER FOR SCREENING FOR MALIGNANT NEOPLASM OF COLON: ICD-10-CM

## 2023-07-10 PROCEDURE — 99213 OFFICE O/P EST LOW 20 MIN: CPT

## 2023-07-10 NOTE — ASSESSMENT
[FreeTextEntry1] : Impression:\par \par #1 chronic diarrhea–subjectively somewhat improved bowel habit pattern at current time.  Still experiencing loose stools but only approximately every 4 days with no bowel movement during time interval between and is evacuating some formed stools (has bowel movements approximately twice per week).  Unclear if medications contribute to current symptoms.  Dietary triggers also considered..\par \par #2 lactose intolerance–milk products predictably trigger gas and excessive flatus.\par \par #3 history colonic polyp–15 mm descending colon polyp removed at colonoscopy of 2019.  Rule out metachronous lesions.\par \par #4 family history colon cancer–affecting first-degree relative (patient's brother age 69) and a second-degree relative (maternal uncle) which poses an increased risk of colorectal neoplasia.\par \par #5 status post open cholecystectomy 1980s.\par \par #6 overweight–BMI 28.93.\par \par #7 history upper GI bleed from PUD approximately 1990–treated for Helicobacter pylori.\par \par General medical problems:\par \par -Hypertension.\par \par -BPH.\par \par -Thyroid nodules.\par \par -Gout.\par \par -Status post anterior cervical spine fusion 2013 and status post posterior cervical thoracic spine fusion 2019.\par \par \par \par \par \par

## 2023-07-10 NOTE — HISTORY OF PRESENT ILLNESS
[FreeTextEntry1] : Office revisit on 7/10/2023.\par \par The patient presents for follow-up in preparation for surveillance colonoscopy.\par \par The patient was previously evaluated for an office consultation on 1/10/2021.\par \par INITIAL CONSULTATION NOTE:\par \par The patient is a 71-year-old man evaluated with complaint of diarrhea of 3 to 4 years duration.  PMD: Dr. Ra Coley.\par \par Patient's chief complaint is diarrhea of 3 to 4 years duration.\par \par Patient's bowel habit characterized as infrequent bowel movements of many years duration.  In the past he recalls his typical bowel movement characterized as a formed Bond 4 bowel movements twice per week.\par \par The patient underwent an open cholecystectomy in .  Ever since then he has had increased flatus.  In addition, following laparoscopic cholecystectomy he observed more softer mushy stools with passage of a Bond 5 bowel movement but still twice per week.\par \par Has longstanding history of lactose intolerance with milk products triggering bloating and gas.\par \par Current complaint of diarrhea has been for 3 to 4 years.  Precipitating factors at that time not reported.  Of note, he describes his episodes of diarrhea as passage of a single watery unformed diarrheal Bond 7 bowel movement once weekly.  As noted, despite his complaint of watery diarrhea of 3 to 4 years duration he still only has 1 bowel movement per week.\par \par For the past 1-1/2-year he describes complaint of "no feeling of movement".  Seems to imply not feeling urge to move his bowels.\par \par Previously evaluated by gastroenterology regarding current symptoms.  Had colonoscopy in 2019 at which time colon polyp removed.  Await report for review.  Not clear if biopsies taken to evaluate for microscopic colitis.  Because of complaint of "no feeling of movement" with infrequent bowel movements patient indicates he is on a regimen of Linzess that he takes approximately once per week.  Does not know dose.  Complaint of diarrhea with watery bowel movements albeit once weekly preceded starting Linzess.\par \par Indicates taking a sugar substitute that may contain sorbitol.\par \par Indicates having previously tried psyllium for 1 month which he does not feel helped.\par \par Intermittently can observe scant red blood on toilet paper only after moving his bowels of many years duration.  No increased rectal bleeding reported.\par \par As noted, for the past 3 to 4 years he has complained of diarrhea described as passage of a watery Bond 7 bowel movement once per week.  On days he has diarrhea he only has 1 diarrheal stool.  During the other 6 days he has no bowel movement.  Does not feel urge to move his bowels.  Denies experiencing abdominal pain in association with diarrhea episodes.  However, does complain of excessive gas and flatus.\par \par Patient was told of having IBS sometime in the .\par \par Patient indicates treatment for an upper GI bleed in  attributed to peptic ulcer disease and he describes being treated for Helicobacter pylori at that time.  Does not indicate whether successful eradication was confirmed.\par \par Denies fever.  Appetite stable.  Has gained 10 pounds over the past few years.  Denies complaints of oral ulcers, dysphagia, heartburn, nausea, vomiting or any abdominal pain.  Does complain of excessive gas manifesting as increased flatus.\par \par Family history of colon cancer reported with brother  age 69.  A maternal uncle also had colon cancer.  Sister decreased from gallbladder cancer in her 70s.\par \par CURRENT HISTORY:\par \par ORV 7/10/2023:     -Patient presents for follow-up in preparation for a surveillance colonoscopy.  Provided last colonoscopy report of 2019 at which time a 15 mm sessile polyp was removed from the descending colon.  Await report as to pathology.  Reports symptomatic improvement compared to assessment at prior consultation of 2022.  Discontinued Linzess.  Currently describes his bowel habits as evacuations every 4 days.  For the initial 3 days he has no urge with no bowel movements.  Then on the fourth day he will evacuate a formed stool.  Subsequently approximately half hour later he will have another evacuation of a loose watery bowel movement and this repeats again approximately 30 minutes later for additional loose bowel movements x 3 (in other words, has initial formed stool and then subsequently approximately every 1/2 hour has an additional 3 loose evacuations).  Never observes blood, mucus or oil.  No associated abdominal pain.  Of note, he indicates his current bowel habit is improved relative to his prior status and he overall feels his current bowel habit is acceptable.  Patient has experienced a 13 pound weight loss over the past 18 months.\par                                -Appetite and weight are stable.  Denies complaints of dysphagia, heartburn, nausea or vomiting.  Denies abdominal pain.  Has experienced hoarseness over the past 3 months.

## 2023-07-10 NOTE — REASON FOR VISIT
[Follow-up] : a follow-up of an existing diagnosis [FreeTextEntry1] : in preparation for a surveillance colonoscopy.

## 2023-07-10 NOTE — PHYSICAL EXAM
[Alert] : alert [Normal Voice/Communication] : normal voice/communication [Healthy Appearing] : healthy appearing [No Acute Distress] : no acute distress [Sclera] : the sclera and conjunctiva were normal [Oropharynx] : the oropharynx was normal [Normal Appearance] : the appearance of the neck was normal [No Neck Mass] : no neck mass was observed [No Respiratory Distress] : no respiratory distress [No Acc Muscle Use] : no accessory muscle use [Auscultation Breath Sounds / Voice Sounds] : lungs were clear to auscultation bilaterally [Respiration, Rhythm And Depth] : normal respiratory rhythm and effort [Heart Rate And Rhythm] : heart rate was normal and rhythm regular [Normal S1, S2] : normal S1 and S2 [Murmurs] : no murmurs [None] : no edema [Bowel Sounds] : normal bowel sounds [Abdomen Tenderness] : non-tender [No Masses] : no abdominal mass palpated [Abdomen Soft] : soft [] : no hepatosplenomegaly [Cervical Lymph Nodes Enlarged Posterior Bilaterally] : no posterior cervical lymphadenopathy [Supraclavicular Lymph Nodes Enlarged Bilaterally] : no supraclavicular lymphadenopathy [Cervical Lymph Nodes Enlarged Anterior Bilaterally] : no anterior cervical lymphadenopathy [No CVA Tenderness] : no CVA  tenderness [Abnormal Walk] : normal gait [No Clubbing, Cyanosis] : no clubbing or cyanosis of the fingernails [Normal Color / Pigmentation] : normal skin color and pigmentation [Oriented To Time, Place, And Person] : oriented to person, place, and time [Normal Affect] : the affect was normal [Normal Insight/Judgment] : insight and judgment were intact [Normal Mood] : the mood was normal [de-identified] : Dentures noted. [de-identified] : Healed oblique right upper quadrant scar is noted.

## 2023-07-10 NOTE — CONSULT LETTER
[Dear  ___] : Dear  [unfilled], [Consult Letter:] : I had the pleasure of evaluating your patient, [unfilled]. [Please see my note below.] : Please see my note below. [Consult Closing:] : Thank you very much for allowing me to participate in the care of this patient.  If you have any questions, please do not hesitate to contact me. [Sincerely,] : Sincerely, [FreeTextEntry2] : Dr. Sammi Catalan [FreeTextEntry3] : Denzel Weber MD

## 2023-07-14 ENCOUNTER — RX RENEWAL (OUTPATIENT)
Age: 73
End: 2023-07-14

## 2023-07-18 ENCOUNTER — OUTPATIENT (OUTPATIENT)
Dept: OUTPATIENT SERVICES | Facility: HOSPITAL | Age: 73
LOS: 1 days | End: 2023-07-18
Payer: COMMERCIAL

## 2023-07-18 ENCOUNTER — APPOINTMENT (OUTPATIENT)
Dept: CT IMAGING | Facility: CLINIC | Age: 73
End: 2023-07-18
Payer: COMMERCIAL

## 2023-07-18 DIAGNOSIS — Z00.8 ENCOUNTER FOR OTHER GENERAL EXAMINATION: ICD-10-CM

## 2023-07-18 DIAGNOSIS — M47.12 OTHER SPONDYLOSIS WITH MYELOPATHY, CERVICAL REGION: ICD-10-CM

## 2023-07-18 DIAGNOSIS — G54.2 CERVICAL ROOT DISORDERS, NOT ELSEWHERE CLASSIFIED: ICD-10-CM

## 2023-07-18 PROCEDURE — 76376 3D RENDER W/INTRP POSTPROCES: CPT

## 2023-07-18 PROCEDURE — 72125 CT NECK SPINE W/O DYE: CPT | Mod: 26

## 2023-07-18 PROCEDURE — 72125 CT NECK SPINE W/O DYE: CPT

## 2023-07-18 PROCEDURE — 76376 3D RENDER W/INTRP POSTPROCES: CPT | Mod: 26

## 2023-07-24 ENCOUNTER — RX RENEWAL (OUTPATIENT)
Age: 73
End: 2023-07-24

## 2023-08-22 ENCOUNTER — APPOINTMENT (OUTPATIENT)
Dept: INTERNAL MEDICINE | Facility: CLINIC | Age: 73
End: 2023-08-22
Payer: COMMERCIAL

## 2023-08-22 ENCOUNTER — TRANSCRIPTION ENCOUNTER (OUTPATIENT)
Age: 73
End: 2023-08-22

## 2023-08-22 VITALS
WEIGHT: 178 LBS | OXYGEN SATURATION: 96 % | SYSTOLIC BLOOD PRESSURE: 144 MMHG | DIASTOLIC BLOOD PRESSURE: 81 MMHG | HEIGHT: 66.14 IN | BODY MASS INDEX: 28.61 KG/M2 | HEART RATE: 61 BPM

## 2023-08-22 VITALS — SYSTOLIC BLOOD PRESSURE: 110 MMHG | DIASTOLIC BLOOD PRESSURE: 76 MMHG

## 2023-08-22 DIAGNOSIS — G62.9 POLYNEUROPATHY, UNSPECIFIED: ICD-10-CM

## 2023-08-22 DIAGNOSIS — R49.0 DYSPHONIA: ICD-10-CM

## 2023-08-22 DIAGNOSIS — I71.20 THORACIC AORTIC ANEURYSM, WITHOUT RUPTURE, UNSPECIFIED: ICD-10-CM

## 2023-08-22 LAB
25(OH)D3 SERPL-MCNC: 50.4 NG/ML
ALBUMIN SERPL ELPH-MCNC: 4.4 G/DL
ALP BLD-CCNC: 80 U/L
ALT SERPL-CCNC: 28 U/L
ANION GAP SERPL CALC-SCNC: 13 MMOL/L
AST SERPL-CCNC: 25 U/L
BILIRUB SERPL-MCNC: 1 MG/DL
BUN SERPL-MCNC: 25 MG/DL
CALCIUM SERPL-MCNC: 9.8 MG/DL
CHLORIDE SERPL-SCNC: 107 MMOL/L
CHOLEST SERPL-MCNC: 99 MG/DL
CO2 SERPL-SCNC: 24 MMOL/L
CREAT SERPL-MCNC: 1.39 MG/DL
EGFR: 54 ML/MIN/1.73M2
ESTIMATED AVERAGE GLUCOSE: 114 MG/DL
GLUCOSE SERPL-MCNC: 104 MG/DL
HBA1C MFR BLD HPLC: 5.6 %
HDLC SERPL-MCNC: 37 MG/DL
LDLC SERPL CALC-MCNC: 41 MG/DL
NONHDLC SERPL-MCNC: 62 MG/DL
POTASSIUM SERPL-SCNC: 4.5 MMOL/L
PROT SERPL-MCNC: 7.3 G/DL
SODIUM SERPL-SCNC: 144 MMOL/L
TRIGL SERPL-MCNC: 112 MG/DL
TSH SERPL-ACNC: 2.2 UIU/ML
URATE SERPL-MCNC: 6.4 MG/DL
VIT B12 SERPL-MCNC: 560 PG/ML

## 2023-08-22 PROCEDURE — 99214 OFFICE O/P EST MOD 30 MIN: CPT | Mod: 25

## 2023-08-22 PROCEDURE — 99397 PER PM REEVAL EST PAT 65+ YR: CPT | Mod: 25

## 2023-08-22 RX ORDER — LINACLOTIDE 290 UG/1
290 CAPSULE, GELATIN COATED ORAL
Refills: 0 | Status: COMPLETED | COMMUNITY
Start: 2022-08-08 | End: 2023-08-22

## 2023-08-22 RX ORDER — ALLOPURINOL 300 MG/1
300 TABLET ORAL
Qty: 90 | Refills: 3 | Status: COMPLETED | COMMUNITY
Start: 2022-05-27 | End: 2023-08-22

## 2023-08-22 RX ORDER — SODIUM SULFATE, POTASSIUM SULFATE AND MAGNESIUM SULFATE 1.6; 3.13; 17.5 G/177ML; G/177ML; G/177ML
17.5-3.13-1.6 SOLUTION ORAL
Qty: 1 | Refills: 0 | Status: COMPLETED | COMMUNITY
Start: 2023-07-10 | End: 2023-08-22

## 2023-08-22 RX ORDER — AMLODIPINE BESYLATE 5 MG/1
5 TABLET ORAL
Qty: 90 | Refills: 1 | Status: COMPLETED | COMMUNITY
Start: 2023-07-24 | End: 2023-08-22

## 2023-08-22 RX ORDER — LINACLOTIDE 290 UG/1
290 CAPSULE, GELATIN COATED ORAL
Qty: 90 | Refills: 1 | Status: COMPLETED | COMMUNITY
Start: 2022-11-28 | End: 2023-08-22

## 2023-08-22 NOTE — HISTORY OF PRESENT ILLNESS
[FreeTextEntry1] : annual [de-identified] : 71 yo M with HTN, CKD, HLD, gout, BPH, thoracic aneurysm.   Hoarseness for past 3 months. Had consultation with GI, not GERD, Will be having colonoscopy in Oct. Cough now and again. Had CXR done with [facility].   CXR-- has hx ascending thoracic aortic aneurysm, 4.2cm, stable for many years. Had echo recently. Denies ever having CT angio of aorta. New finding on CXR -- soft tissue density in right paratracheal region representing tortuosity and dilation of great vessel. Managed on nebivolol currently.  thyroid nodules-- follows with endo.  Follows with neurologist for cervical and lumbar stenosis. Currently on duloxetine. Finds it helpful but does not take daily. CKD-- follows nephrologist. Stable for now.

## 2023-08-22 NOTE — PHYSICAL EXAM
[Normal Gait] : normal gait [Normal] : affect was normal and insight and judgment were intact [de-identified] : slight PND noted.

## 2023-08-22 NOTE — HISTORY OF PRESENT ILLNESS
[FreeTextEntry1] : annual [de-identified] : 71 yo M with HTN, CKD, HLD, gout, BPH, thoracic aneurysm.   Hoarseness for past 3 months. Had consultation with GI, not GERD, Will be having colonoscopy in Oct. Cough now and again. Had CXR done with [facility].   CXR-- has hx ascending thoracic aortic aneurysm, 4.2cm, stable for many years. Had echo recently. Denies ever having CT angio of aorta. New finding on CXR -- soft tissue density in right paratracheal region representing tortuosity and dilation of great vessel. Managed on nebivolol currently.  thyroid nodules-- follows with endo.  Follows with neurologist for cervical and lumbar stenosis. Currently on duloxetine. Finds it helpful but does not take daily. CKD-- follows nephrologist. Stable for now.

## 2023-08-22 NOTE — PHYSICAL EXAM
[Normal Gait] : normal gait [Normal] : affect was normal and insight and judgment were intact [de-identified] : slight PND noted.

## 2023-08-22 NOTE — HEALTH RISK ASSESSMENT
[No falls in past year] : Patient reported no falls in the past year [Fully functional (bathing, dressing, toileting, transferring, walking, feeding)] : Fully functional (bathing, dressing, toileting, transferring, walking, feeding) [Fully functional (using the telephone, shopping, preparing meals, housekeeping, doing laundry, using] : Fully functional and needs no help or supervision to perform IADLs (using the telephone, shopping, preparing meals, housekeeping, doing laundry, using transportation, managing medications and managing finances) [ColonoscopyDate] : 2019 [ColonoscopyComments] : scheduled Oct 2023.

## 2023-08-29 ENCOUNTER — APPOINTMENT (OUTPATIENT)
Dept: OTOLARYNGOLOGY | Facility: CLINIC | Age: 73
End: 2023-08-29
Payer: COMMERCIAL

## 2023-08-29 VITALS
BODY MASS INDEX: 28.61 KG/M2 | SYSTOLIC BLOOD PRESSURE: 148 MMHG | HEIGHT: 66 IN | OXYGEN SATURATION: 98 % | TEMPERATURE: 97.8 F | WEIGHT: 178 LBS | RESPIRATION RATE: 17 BRPM | HEART RATE: 55 BPM | DIASTOLIC BLOOD PRESSURE: 84 MMHG

## 2023-08-29 DIAGNOSIS — Z80.0 FAMILY HISTORY OF MALIGNANT NEOPLASM OF DIGESTIVE ORGANS: ICD-10-CM

## 2023-08-29 DIAGNOSIS — R49.0 DYSPHONIA: ICD-10-CM

## 2023-08-29 DIAGNOSIS — R05.3 CHRONIC COUGH: ICD-10-CM

## 2023-08-29 PROCEDURE — 99204 OFFICE O/P NEW MOD 45 MIN: CPT | Mod: 25

## 2023-08-29 PROCEDURE — 31579 LARYNGOSCOPY TELESCOPIC: CPT

## 2023-08-29 RX ORDER — ERGOCALCIFEROL 1.25 MG/1
1.25 MG CAPSULE ORAL
Qty: 12 | Refills: 3 | Status: COMPLETED | COMMUNITY
Start: 2019-07-24 | End: 2023-08-29

## 2023-08-29 NOTE — ASSESSMENT
[FreeTextEntry1] : Assessment/Plan: #1 Idiopathic chronic cough #2 Muscle tension dysphonia  After a thorough discussion regarding our findings today the patient understands that they have the presumptive diagnosis of idiopathic chronic cough.  They will call me with results of CT chest and if nothing concerning found they will start my chronic cough protocol.  I will prescribe at that time.  They are to take 30 mg of prednisone daily for a total of two weeks. The day of their last dose they are to start using an inhaled steroid two puffs twice daily until I discontinue it. Over half of patients with chronic cough will have a complete cessation or near complete cessation of their cough doing this protocol. If they have resolution or near resolution of cough, I will continue them on the inhaled steroid for three more months. At that time they may attempt to slowly taper their dose. Most patients are able to get completely off their inhaled steroid, but if the cough starts to return they are to stay on the maximum dose of 2 puffs twice a day. If they are using QVAR they do not need a spacer, but if using a different inhaled steroid I strongly suggest using a spacer. Regardless, they should rinse their mouth out after each use. I have discussed the risks, benefits, and alternatives to care in regards to the oral steroids. If their insurance does not cover the steroid inhaler I prescribed the following are all acceptable substitutes:  Qvar redihaler (Beclomethasone) 80 mcg 2 puffs BID Flovent HFA (Fluticasone propionate) 220 mcg 2 puffs BID Alvesco (Ciclesonide) 160 mcg 2 puffs BID Pulmicort (Budesomide) 180 mcg 2 puffs BID Asthmanex HFA (Mometasone) 200 mcg 2 puffs BID  Should they fail the Chronic Cough Protocol and still have a cough after 2 weeks of prednisone they are to call or message my office to update me.  At that point we would order them for a CT chest w/o contrast, start them on gabapentin, and schedule a follow up with me.    The patient expressed understanding with this plan and received a handout explaining it in detail.  The risks, benefits, and alternatives were discussed.  Patient will reach out with any questions.

## 2023-08-29 NOTE — PROCEDURE
[de-identified] : Stroboscopic Laryngoscopy Procedure Note:  Indication:	Assess laryngeal biomechanics and vocal fold oscillation.  Description of Procedure:	Informed consent was verbally obtained from the patient prior to the procedure. The patient was seated in the clinic chair. Topical anesthesia was achieved by first spraying the nasal cavities with 4% lidocaine and nasal decongestant.   Findings:  Supraglottis: no masses or lesions  Glottis:    Structure:                        Right: crisp and shows no lesions or masses                        Left:  crisp and shows no lesions or masses                 Mobility:                        Right:  normal                        Left:  normal                Amplitude:                        Right:  normal                       Left:  normal                Closure: complete                 Wave symmetry:  symmetric  Subglottis: no masses or lesions within the visualized subglottis Visualized airway is widely patent. Other: Severe MTD

## 2023-08-29 NOTE — PHYSICAL EXAM
[de-identified] : poor extension [Normal] : mucosa is normal [Midline] : trachea located in midline position

## 2023-08-29 NOTE — HISTORY OF PRESENT ILLNESS
[de-identified] : JOANNE SOLIZ is a 72 year old male who is coming to the [facility] for evaluation of a chronic cough and dysphonia.  They note a cough for the past 5 months.  Specific triggers for the cough include: none They note severe paroxysms of cough.  They note coughing as they lie down for bed.   They DENY being awakened from sleep by this cough frequently.   They DENY difficulties with swallowing.  They note changes in their voice, voice is hoarse for 4 months.  They DENY note problems with breathing.   They DENY note frequent classic heartburn symptoms.   They have NO hx of smoking.    They have tried the following meds in an attempt to treat the cough: Nothing.  They are NOT maintained on an ACE inhibitor.  Patient denies dysphagia, odynophagia, throat pain, pain while voicing, SOB or recent infections.   PREVIOUS STUDIES: Pending CT chest next week

## 2023-10-03 ENCOUNTER — APPOINTMENT (OUTPATIENT)
Dept: GASTROENTEROLOGY | Facility: HOSPITAL | Age: 73
End: 2023-10-03

## 2024-02-07 ENCOUNTER — APPOINTMENT (OUTPATIENT)
Dept: NEPHROLOGY | Facility: CLINIC | Age: 74
End: 2024-02-07

## 2024-02-09 ENCOUNTER — APPOINTMENT (OUTPATIENT)
Dept: NEPHROLOGY | Facility: CLINIC | Age: 74
End: 2024-02-09
Payer: COMMERCIAL

## 2024-02-09 ENCOUNTER — NON-APPOINTMENT (OUTPATIENT)
Age: 74
End: 2024-02-09

## 2024-02-09 PROCEDURE — 99214 OFFICE O/P EST MOD 30 MIN: CPT

## 2024-02-09 RX ORDER — APIXABAN 5 MG/1
5 TABLET, FILM COATED ORAL
Refills: 0 | Status: ACTIVE | COMMUNITY
Start: 2024-02-09

## 2024-02-09 RX ORDER — AMLODIPINE BESYLATE 10 MG/1
10 TABLET ORAL
Refills: 0 | Status: ACTIVE | COMMUNITY
Start: 2024-02-09

## 2024-02-09 NOTE — ASSESSMENT
[FreeTextEntry1] : Mr. SOLIZ is a 73 year old male with  origin and chronic hx of HTN and hematuria and now CKD over the last few years with worsening proteinuria. IgA Nephropathy is highest on the ddx with other chronic interstitial diseases vs Thin basement membrane likely. FSGS might be seen but more secondary here. Overall, this is progressive CKD and a kidney bx may help more for diagnostic and prognostic reasons  IgA Neph with CKD- no bx for now as pt wants to decide eventually he wants or not Explained to him that he can benefit from clinical trial ongoing for igAN -- he will decide eventually for now doesn't want it started on ARB + SGLT2i combo for now, has remained stable. Checking urine for proteinuria this week Now BLANQUITA likely in setting of SGLT2i starting few months ago and recent A Fib   f/.u 3-4 months virtually I have reviewed and confirmed nurses' notes for patient's medications, allergies, medical history, and surgical history.

## 2024-02-09 NOTE — HISTORY OF PRESENT ILLNESS
[FreeTextEntry1] : Mr. SOLIZ is a 73 year old male with hx of hematuria for years and CKD with crt in 1.6 range here for a follow up. He has had hematuria dating back to 2004. His crt was normal in 2004 at 1.0mg/dl and since 2018 onwards crt started to rise in 1.3 range and then in 2019 was 1.6. He had cysto done in 2019 that was neg. He was seen by Nephrologist in [facility] for a cards clearance and was stable. Since then has followed with a nephrologist in [facility] Dr Sainz and crt in 1.6mg/dl range. He also has non obstructive BPH, longstanding HTN on beta blockers only. He used to be on ACEi, not sure when that was stopped and why. In significant pain thoracic spine, and very little neck mobility, significant impairment in his quality of life post May cervical/thoracic spinal surgery. Doesn't like to leave his apt because of the pain and limited mobility. Voice also changed the thinks 2/2 intubation. Was taking NSAID in the past. Strong FH of cancer but no kidney disease. Primary Nephrologist concerned re IgA Nephropathy. Recently labs also notable for 1.6gm of proteinuria on a 24 hour collection. He has never been biopsied. Was started on ARB and SGLT2.   Since last visit, he is doing well, currently no acute complaints.   Feb 2024- since last visit, had A fib and now on eliquis, and bp meds adjusted. now on norvasc as well Crt 1.6 in Jan 2024 [Home] : at home, [unfilled] , at the time of the visit. [Medical Office: ([facility])___] : at the medical office located in  [Verbal consent obtained from patient] : the patient, [unfilled]

## 2024-02-09 NOTE — REASON FOR VISIT
[Home] : at home, [unfilled] , at the time of the visit. [Medical Office: ([facility])___] : at the medical office located in  [Follow-Up] : a follow-up visit

## 2024-02-16 ENCOUNTER — APPOINTMENT (OUTPATIENT)
Dept: CARDIOLOGY | Facility: CLINIC | Age: 74
End: 2024-02-16
Payer: COMMERCIAL

## 2024-02-16 ENCOUNTER — NON-APPOINTMENT (OUTPATIENT)
Age: 74
End: 2024-02-16

## 2024-02-16 VITALS
HEART RATE: 72 BPM | SYSTOLIC BLOOD PRESSURE: 106 MMHG | TEMPERATURE: 98.1 F | DIASTOLIC BLOOD PRESSURE: 66 MMHG | OXYGEN SATURATION: 95 % | BODY MASS INDEX: 29.38 KG/M2 | RESPIRATION RATE: 17 BRPM | WEIGHT: 182 LBS

## 2024-02-16 DIAGNOSIS — R06.02 SHORTNESS OF BREATH: ICD-10-CM

## 2024-02-16 PROCEDURE — 93000 ELECTROCARDIOGRAM COMPLETE: CPT | Mod: 59

## 2024-02-16 PROCEDURE — 93015 CV STRESS TEST SUPVJ I&R: CPT

## 2024-02-16 PROCEDURE — 99204 OFFICE O/P NEW MOD 45 MIN: CPT | Mod: 25

## 2024-02-16 PROCEDURE — 93306 TTE W/DOPPLER COMPLETE: CPT

## 2024-02-20 ENCOUNTER — APPOINTMENT (OUTPATIENT)
Dept: INTERNAL MEDICINE | Facility: CLINIC | Age: 74
End: 2024-02-20
Payer: COMMERCIAL

## 2024-02-20 VITALS
WEIGHT: 180 LBS | DIASTOLIC BLOOD PRESSURE: 80 MMHG | SYSTOLIC BLOOD PRESSURE: 118 MMHG | BODY MASS INDEX: 28.93 KG/M2 | HEIGHT: 66 IN | HEART RATE: 56 BPM | OXYGEN SATURATION: 99 %

## 2024-02-20 DIAGNOSIS — I10 ESSENTIAL (PRIMARY) HYPERTENSION: ICD-10-CM

## 2024-02-20 DIAGNOSIS — E78.5 HYPERLIPIDEMIA, UNSPECIFIED: ICD-10-CM

## 2024-02-20 PROCEDURE — 99214 OFFICE O/P EST MOD 30 MIN: CPT

## 2024-02-20 PROCEDURE — G2211 COMPLEX E/M VISIT ADD ON: CPT | Mod: NC,1L

## 2024-02-20 RX ORDER — DULOXETINE HYDROCHLORIDE 30 MG/1
30 CAPSULE, DELAYED RELEASE PELLETS ORAL DAILY
Qty: 90 | Refills: 3 | Status: COMPLETED | COMMUNITY
Start: 2023-08-22 | End: 2024-02-20

## 2024-02-20 NOTE — PHYSICAL EXAM
[Normal] : affect was normal and insight and judgment were intact [de-identified] : straightening of cervical spine, b/l scalene and upper traps swollen and firm. Very limited neck ROM.

## 2024-02-20 NOTE — HISTORY OF PRESENT ILLNESS
[FreeTextEntry1] : f/u [de-identified] : 74 yo M with thoracic aortic aneurysm, afib, HTN, CKD, gout, BPH, cervicalgia presents for f/u.  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 He does follow up with [facility] physicians as well as [facility] physicians. Pt was found to have bradycardia, [facility] cardiology discontinued nebivolol. Pt underwent holter monitor with Dr. Flowers, found to be in afib, now on eliquis. Currently still off beta blockers. HR remains low. Pt had stress test this past Fri, results unavailable yet.  Pt does report occasional dizziness/unbalance. Pt also with long standing cervicalgia after fusion surgery. There is constant pain. Pt unable to straighten neck, and center of gravity shifted as he is mostly leaning forward as he walks. He is also unable to really turn his neck to either side without having to turn his body as well.   Follows with Dr. Aleman for CKD-- stable.  gout-- was on allopurinol previously but not anymore. Has not had gout flare up.

## 2024-02-28 NOTE — HISTORY OF PRESENT ILLNESS
[FreeTextEntry1] : 73 year-old male with PAF, HTN, DM, HLD, CKD, presents for cardiac evaluation. Patient denies CP. He reports mild SOB with exertion and palpitations lasting seconds. Patient denies h/o syncope. Patient is on Losartan 50 mg, Farxiga 10 mg, Amlodipine 10 mg, Eliquis 5 mg BID. Rosuvastatin 10 mg. I advised patient to undergo an echocardiogram and a treadmill stress test.   (1) PAF - I advised patient to continue Eliquis for stroke prevention.  I advised patient to consider Multaq 400 mg BID or ablation.  (2) HTN - BP on the low side.  (3) Aortic aneurysm - Patient should have a followup echo in 1 year.   (3) Calcified coronary arteries - I advised patient to continue statin therapy for cardioprotection.

## 2024-02-28 NOTE — PHYSICAL EXAM
[Well Developed] : well developed [Well Nourished] : well nourished [No Acute Distress] : no acute distress [Normal Conjunctiva] : normal conjunctiva [Normal Venous Pressure] : normal venous pressure [No Carotid Bruit] : no carotid bruit [Normal S1, S2] : normal S1, S2 [No Murmur] : no murmur [No Rub] : no rub [No Gallop] : no gallop [Clear Lung Fields] : clear lung fields [Good Air Entry] : good air entry [No Respiratory Distress] : no respiratory distress  [Non Tender] : non-tender [Soft] : abdomen soft [Normal Bowel Sounds] : normal bowel sounds [No Masses/organomegaly] : no masses/organomegaly [Normal Gait] : normal gait [No Edema] : no edema [No Cyanosis] : no cyanosis [No Clubbing] : no clubbing [No Varicosities] : no varicosities [Moves all extremities] : moves all extremities [Normal Speech] : normal speech [No Focal Deficits] : no focal deficits [Alert and Oriented] : alert and oriented [Normal memory] : normal memory

## 2024-03-04 ENCOUNTER — APPOINTMENT (OUTPATIENT)
Dept: ULTRASOUND IMAGING | Facility: CLINIC | Age: 74
End: 2024-03-04
Payer: COMMERCIAL

## 2024-03-04 PROCEDURE — 76536 US EXAM OF HEAD AND NECK: CPT

## 2024-03-29 ENCOUNTER — RX RENEWAL (OUTPATIENT)
Age: 74
End: 2024-03-29

## 2024-03-29 RX ORDER — DAPAGLIFLOZIN 10 MG/1
10 TABLET, FILM COATED ORAL
Qty: 90 | Refills: 3 | Status: ACTIVE | COMMUNITY
Start: 2021-04-06 | End: 1900-01-01

## 2024-04-03 DIAGNOSIS — M10.9 GOUT, UNSPECIFIED: ICD-10-CM

## 2024-04-03 RX ORDER — ALLOPURINOL 100 MG/1
100 TABLET ORAL DAILY
Qty: 1 | Refills: 3 | Status: ACTIVE | COMMUNITY
Start: 2024-04-03 | End: 1900-01-01

## 2024-04-08 ENCOUNTER — RX RENEWAL (OUTPATIENT)
Age: 74
End: 2024-04-08

## 2024-04-14 ENCOUNTER — RX RENEWAL (OUTPATIENT)
Age: 74
End: 2024-04-14

## 2024-04-14 RX ORDER — LOSARTAN POTASSIUM 50 MG/1
50 TABLET, FILM COATED ORAL
Qty: 90 | Refills: 3 | Status: ACTIVE | COMMUNITY
Start: 2021-04-06 | End: 1900-01-01

## 2024-05-20 ENCOUNTER — OUTPATIENT (OUTPATIENT)
Dept: OUTPATIENT SERVICES | Facility: HOSPITAL | Age: 74
LOS: 1 days | End: 2024-05-20
Payer: COMMERCIAL

## 2024-05-20 ENCOUNTER — RESULT REVIEW (OUTPATIENT)
Age: 74
End: 2024-05-20

## 2024-05-20 ENCOUNTER — APPOINTMENT (OUTPATIENT)
Dept: MRI IMAGING | Facility: CLINIC | Age: 74
End: 2024-05-20
Payer: COMMERCIAL

## 2024-05-20 DIAGNOSIS — G54.2 CERVICAL ROOT DISORDERS, NOT ELSEWHERE CLASSIFIED: ICD-10-CM

## 2024-05-20 PROCEDURE — 72141 MRI NECK SPINE W/O DYE: CPT | Mod: 26

## 2024-05-20 PROCEDURE — 72141 MRI NECK SPINE W/O DYE: CPT

## 2024-06-27 DIAGNOSIS — N18.30 CHRONIC KIDNEY DISEASE, STAGE 3 UNSPECIFIED: ICD-10-CM

## 2024-08-07 ENCOUNTER — APPOINTMENT (OUTPATIENT)
Dept: NEPHROLOGY | Facility: CLINIC | Age: 74
End: 2024-08-07

## 2024-08-07 PROCEDURE — 99442: CPT

## 2024-08-10 PROBLEM — I48.0 PAROXYSMAL ATRIAL FIBRILLATION: Status: ACTIVE | Noted: 2024-08-10

## 2024-08-10 NOTE — PHYSICAL EXAM
[Well Developed] : well developed [Well Nourished] : well nourished [No Acute Distress] : no acute distress [Normal Venous Pressure] : normal venous pressure [Normal Conjunctiva] : normal conjunctiva [No Carotid Bruit] : no carotid bruit [Normal S1, S2] : normal S1, S2 [No Murmur] : no murmur [No Gallop] : no gallop [No Rub] : no rub [Clear Lung Fields] : clear lung fields [Good Air Entry] : good air entry [No Respiratory Distress] : no respiratory distress  [Soft] : abdomen soft [Non Tender] : non-tender [No Masses/organomegaly] : no masses/organomegaly [Normal Bowel Sounds] : normal bowel sounds [Normal Gait] : normal gait [No Edema] : no edema [No Cyanosis] : no cyanosis [No Clubbing] : no clubbing [No Varicosities] : no varicosities [Moves all extremities] : moves all extremities [No Focal Deficits] : no focal deficits [Normal Speech] : normal speech [Alert and Oriented] : alert and oriented [Normal memory] : normal memory

## 2024-08-12 ENCOUNTER — NON-APPOINTMENT (OUTPATIENT)
Age: 74
End: 2024-08-12

## 2024-08-12 ENCOUNTER — APPOINTMENT (OUTPATIENT)
Dept: CARDIOLOGY | Facility: CLINIC | Age: 74
End: 2024-08-12

## 2024-08-12 VITALS
HEART RATE: 75 BPM | WEIGHT: 174 LBS | RESPIRATION RATE: 18 BRPM | OXYGEN SATURATION: 98 % | SYSTOLIC BLOOD PRESSURE: 130 MMHG | DIASTOLIC BLOOD PRESSURE: 77 MMHG | BODY MASS INDEX: 28.08 KG/M2

## 2024-08-12 DIAGNOSIS — I10 ESSENTIAL (PRIMARY) HYPERTENSION: ICD-10-CM

## 2024-08-12 DIAGNOSIS — I48.0 PAROXYSMAL ATRIAL FIBRILLATION: ICD-10-CM

## 2024-08-12 DIAGNOSIS — I71.20 THORACIC AORTIC ANEURYSM, WITHOUT RUPTURE, UNSPECIFIED: ICD-10-CM

## 2024-08-12 PROCEDURE — 93000 ELECTROCARDIOGRAM COMPLETE: CPT

## 2024-08-12 PROCEDURE — 99214 OFFICE O/P EST MOD 30 MIN: CPT | Mod: 25

## 2024-08-12 NOTE — HISTORY OF PRESENT ILLNESS
[FreeTextEntry1] : 8/12/24 - I advised patient to continue current medications.  FU 6 months.   2/16/24 - Patient denies CP. He reports mild SOB with exertion and palpitations lasting seconds. Patient denies h/o syncope. Patient is on Losartan 50 mg, Farxiga 10 mg, Amlodipine 10 mg, Eliquis 5 mg BID. Rosuvastatin 10 mg. I advised patient to undergo an echocardiogram and a treadmill stress test.  Patient underwent an echocardiogram and it showed normal LV function, mild aortic root dilatation (4.2 cm), mild dilatation of the ascending aorta (4.2 cm), with mild AR.   Patient underwent a treadmill stress test and completed 6 minutes of Judd protocol.  There was no ECG evidence of ischemia.  Following treadmill stress, there was no echocardiographic evidence of ischemia.  However, patient was unable to reach target HR.  I advised patient to continue Eliquis for stroke prevention.  I advised patient to consider Multaq 400 mg BID or ablation. Patient should have a followup echo in 1 year.  I advised patient to continue statin therapy for cardioprotection given calcified coronary arteries.

## 2024-08-12 NOTE — REASON FOR VISIT
[FreeTextEntry1] : 73 year-old male with PAF, HTN, DM, HLD, CKD, presents for followup.    Patient was last seen on 2/16/24 for cardiac evaluation.  Patient denies CP. He reports mild SOB with exertion and palpitations lasting seconds. Patient denies h/o syncope. Patient is on Losartan 50 mg, Farxiga 10 mg, Amlodipine 10 mg, Eliquis 5 mg BID. Rosuvastatin 10 mg. I advised patient to undergo an echocardiogram and a treadmill stress test.  Patient underwent an echocardiogram and it showed normal LV function, mild aortic root dilatation (4.2 cm), mild dilatation of the ascending aorta (4.2 cm), with mild AR.   Patient underwent a treadmill stress test and completed 6 minutes of Judd protocol.  There was no ECG evidence of ischemia.  Following treadmill stress, there was no echocardiographic evidence of ischemia.  However, patient was unable to reach target HR.  I advised patient to continue Eliquis for stroke prevention.  I advised patient to consider Multaq 400 mg BID or ablation. Patient should have a followup echo in 1 year.  I advised patient to continue statin therapy for cardioprotection given calcified coronary arteries.    He is on Eliquis 5 mg BID for stroke prevention.  He is on Amlodipine 10 mg, Losartan 50 mg, for HTN.  He is on Farxiga 10 mg for DM.

## 2024-08-20 ENCOUNTER — NON-APPOINTMENT (OUTPATIENT)
Age: 74
End: 2024-08-20

## 2024-11-27 DIAGNOSIS — R73.03 PREDIABETES.: ICD-10-CM

## 2024-11-27 DIAGNOSIS — E78.5 HYPERLIPIDEMIA, UNSPECIFIED: ICD-10-CM

## 2024-11-27 DIAGNOSIS — Z12.5 ENCOUNTER FOR SCREENING FOR MALIGNANT NEOPLASM OF PROSTATE: ICD-10-CM

## 2024-11-27 DIAGNOSIS — R05.3 CHRONIC COUGH: ICD-10-CM

## 2024-11-27 DIAGNOSIS — G62.9 POLYNEUROPATHY, UNSPECIFIED: ICD-10-CM

## 2024-12-06 DIAGNOSIS — N40.1 BENIGN PROSTATIC HYPERPLASIA WITH LOWER URINARY TRACT SYMPMS: ICD-10-CM

## 2024-12-06 DIAGNOSIS — N18.30 CHRONIC KIDNEY DISEASE, STAGE 3 UNSPECIFIED: ICD-10-CM

## 2024-12-06 DIAGNOSIS — N13.8 BENIGN PROSTATIC HYPERPLASIA WITH LOWER URINARY TRACT SYMPMS: ICD-10-CM

## 2024-12-11 ENCOUNTER — NON-APPOINTMENT (OUTPATIENT)
Age: 74
End: 2024-12-11

## 2024-12-13 ENCOUNTER — APPOINTMENT (OUTPATIENT)
Dept: INTERNAL MEDICINE | Facility: CLINIC | Age: 74
End: 2024-12-13
Payer: COMMERCIAL

## 2024-12-13 VITALS
OXYGEN SATURATION: 100 % | SYSTOLIC BLOOD PRESSURE: 121 MMHG | BODY MASS INDEX: 28.45 KG/M2 | DIASTOLIC BLOOD PRESSURE: 77 MMHG | HEIGHT: 66 IN | HEART RATE: 74 BPM | WEIGHT: 177 LBS

## 2024-12-13 DIAGNOSIS — R09.82 POSTNASAL DRIP: ICD-10-CM

## 2024-12-13 DIAGNOSIS — E04.1 NONTOXIC SINGLE THYROID NODULE: ICD-10-CM

## 2024-12-13 DIAGNOSIS — N28.1 CYST OF KIDNEY, ACQUIRED: ICD-10-CM

## 2024-12-13 DIAGNOSIS — Z00.00 ENCOUNTER FOR GENERAL ADULT MEDICAL EXAMINATION W/OUT ABNORMAL FINDINGS: ICD-10-CM

## 2024-12-13 DIAGNOSIS — E55.9 VITAMIN D DEFICIENCY, UNSPECIFIED: ICD-10-CM

## 2024-12-13 DIAGNOSIS — R79.89 OTHER SPECIFIED ABNORMAL FINDINGS OF BLOOD CHEMISTRY: ICD-10-CM

## 2024-12-13 PROCEDURE — 99397 PER PM REEVAL EST PAT 65+ YR: CPT

## 2024-12-13 PROCEDURE — 99214 OFFICE O/P EST MOD 30 MIN: CPT | Mod: 25

## 2024-12-13 RX ORDER — CHOLECALCIFEROL (VITAMIN D3) 1250 MCG
1.25 MG CAPSULE ORAL
Qty: 1 | Refills: 3 | Status: ACTIVE | COMMUNITY
Start: 2024-12-13 | End: 1900-01-01

## 2024-12-13 RX ORDER — AZELASTINE HYDROCHLORIDE 137 UG/1
137 SPRAY, METERED NASAL
Qty: 1 | Refills: 2 | Status: ACTIVE | COMMUNITY
Start: 2024-12-13 | End: 1900-01-01

## 2024-12-15 PROBLEM — R09.82 POSTNASAL DRIP: Status: ACTIVE | Noted: 2024-12-13

## 2024-12-21 ENCOUNTER — APPOINTMENT (OUTPATIENT)
Dept: ULTRASOUND IMAGING | Facility: CLINIC | Age: 74
End: 2024-12-21
Payer: COMMERCIAL

## 2024-12-21 ENCOUNTER — OUTPATIENT (OUTPATIENT)
Dept: OUTPATIENT SERVICES | Facility: HOSPITAL | Age: 74
LOS: 1 days | End: 2024-12-21
Payer: COMMERCIAL

## 2024-12-21 DIAGNOSIS — E04.1 NONTOXIC SINGLE THYROID NODULE: ICD-10-CM

## 2024-12-21 DIAGNOSIS — N28.1 CYST OF KIDNEY, ACQUIRED: ICD-10-CM

## 2024-12-21 PROCEDURE — 76536 US EXAM OF HEAD AND NECK: CPT | Mod: 26

## 2024-12-21 PROCEDURE — 76775 US EXAM ABDO BACK WALL LIM: CPT | Mod: 26

## 2024-12-21 PROCEDURE — 76536 US EXAM OF HEAD AND NECK: CPT

## 2024-12-21 PROCEDURE — 76775 US EXAM ABDO BACK WALL LIM: CPT

## 2025-01-02 ENCOUNTER — RX RENEWAL (OUTPATIENT)
Age: 75
End: 2025-01-02

## 2025-01-08 ENCOUNTER — APPOINTMENT (OUTPATIENT)
Dept: NEPHROLOGY | Facility: CLINIC | Age: 75
End: 2025-01-08

## 2025-01-08 DIAGNOSIS — N18.30 CHRONIC KIDNEY DISEASE, STAGE 3 UNSPECIFIED: ICD-10-CM

## 2025-01-15 ENCOUNTER — APPOINTMENT (OUTPATIENT)
Dept: NEPHROLOGY | Facility: CLINIC | Age: 75
End: 2025-01-15

## 2025-01-21 ENCOUNTER — RX RENEWAL (OUTPATIENT)
Age: 75
End: 2025-01-21

## 2025-02-06 ENCOUNTER — RX RENEWAL (OUTPATIENT)
Age: 75
End: 2025-02-06

## 2025-02-10 ENCOUNTER — APPOINTMENT (OUTPATIENT)
Dept: CARDIOLOGY | Facility: CLINIC | Age: 75
End: 2025-02-10

## 2025-03-24 ENCOUNTER — NON-APPOINTMENT (OUTPATIENT)
Age: 75
End: 2025-03-24

## 2025-03-24 DIAGNOSIS — M54.9 DORSALGIA, UNSPECIFIED: ICD-10-CM

## 2025-03-24 RX ORDER — TIZANIDINE 2 MG/1
2 TABLET ORAL
Qty: 90 | Refills: 3 | Status: ACTIVE | COMMUNITY
Start: 2025-03-24 | End: 1900-01-01

## 2025-04-16 DIAGNOSIS — N18.30 CHRONIC KIDNEY DISEASE, STAGE 3 UNSPECIFIED: ICD-10-CM

## 2025-04-16 DIAGNOSIS — E78.5 HYPERLIPIDEMIA, UNSPECIFIED: ICD-10-CM

## 2025-04-16 DIAGNOSIS — M10.9 GOUT, UNSPECIFIED: ICD-10-CM

## 2025-04-16 DIAGNOSIS — R79.89 OTHER SPECIFIED ABNORMAL FINDINGS OF BLOOD CHEMISTRY: ICD-10-CM

## 2025-04-16 DIAGNOSIS — R73.03 PREDIABETES.: ICD-10-CM

## 2025-05-13 ENCOUNTER — APPOINTMENT (OUTPATIENT)
Dept: INTERNAL MEDICINE | Facility: CLINIC | Age: 75
End: 2025-05-13
Payer: COMMERCIAL

## 2025-05-13 VITALS
WEIGHT: 178 LBS | DIASTOLIC BLOOD PRESSURE: 79 MMHG | BODY MASS INDEX: 28.61 KG/M2 | OXYGEN SATURATION: 98 % | SYSTOLIC BLOOD PRESSURE: 131 MMHG | HEART RATE: 71 BPM | HEIGHT: 66 IN

## 2025-05-13 DIAGNOSIS — J06.9 ACUTE UPPER RESPIRATORY INFECTION, UNSPECIFIED: ICD-10-CM

## 2025-05-13 DIAGNOSIS — R73.03 PREDIABETES.: ICD-10-CM

## 2025-05-13 PROCEDURE — 99214 OFFICE O/P EST MOD 30 MIN: CPT

## 2025-05-13 RX ORDER — LANCETS 33 GAUGE
EACH MISCELLANEOUS
Qty: 1 | Refills: 3 | Status: ACTIVE | COMMUNITY
Start: 2025-05-13 | End: 1900-01-01

## 2025-05-13 RX ORDER — AZITHROMYCIN 250 MG/1
250 TABLET, FILM COATED ORAL
Qty: 1 | Refills: 0 | Status: ACTIVE | COMMUNITY
Start: 2025-05-13 | End: 1900-01-01

## 2025-05-13 RX ORDER — ALCOHOL ANTISEPTIC PADS
PADS, MEDICATED (EA) TOPICAL
Qty: 6 | Refills: 3 | Status: ACTIVE | COMMUNITY
Start: 2025-05-13 | End: 1900-01-01

## 2025-05-13 RX ORDER — LANCING DEVICE
EACH MISCELLANEOUS
Qty: 1 | Refills: 0 | Status: ACTIVE | COMMUNITY
Start: 2025-05-13 | End: 1900-01-01

## 2025-05-13 RX ORDER — PROMETHAZINE HYDROCHLORIDE AND DEXTROMETHORPHAN HYDROBROMIDE ORAL SOLUTION 15; 6.25 MG/5ML; MG/5ML
6.25-15 SOLUTION ORAL
Qty: 1 | Refills: 1 | Status: ACTIVE | COMMUNITY
Start: 2025-05-13 | End: 1900-01-01

## 2025-05-29 DIAGNOSIS — L21.9 SEBORRHEIC DERMATITIS, UNSPECIFIED: ICD-10-CM

## 2025-05-29 RX ORDER — KETOCONAZOLE 20 MG/ML
2 SHAMPOO TOPICAL
Qty: 1 | Refills: 2 | Status: ACTIVE | COMMUNITY
Start: 2025-05-29 | End: 1900-01-01

## 2025-08-08 ENCOUNTER — NON-APPOINTMENT (OUTPATIENT)
Age: 75
End: 2025-08-08

## 2025-08-08 LAB
ALBUMIN SERPL ELPH-MCNC: 4.1 G/DL
ALP BLD-CCNC: 72 U/L
ALT SERPL-CCNC: 23 U/L
ANION GAP SERPL CALC-SCNC: 13 MMOL/L
APPEARANCE: CLEAR
AST SERPL-CCNC: 14 U/L
BACTERIA: NEGATIVE /HPF
BILIRUB SERPL-MCNC: 0.4 MG/DL
BILIRUBIN URINE: NEGATIVE
BLOOD URINE: ABNORMAL
BUN SERPL-MCNC: 25 MG/DL
CALCIUM SERPL-MCNC: 9.8 MG/DL
CALCIUM SERPL-MCNC: 9.8 MG/DL
CAST: 0 /LPF
CHLORIDE SERPL-SCNC: 107 MMOL/L
CHOLEST SERPL-MCNC: 103 MG/DL
CO2 SERPL-SCNC: 23 MMOL/L
COLOR: YELLOW
CREAT SERPL-MCNC: 1.54 MG/DL
CREAT SPEC-SCNC: 98 MG/DL
CREAT/PROT UR: 0.5 RATIO
EGFRCR SERPLBLD CKD-EPI 2021: 47 ML/MIN/1.73M2
EPITHELIAL CELLS: 0 /HPF
GLUCOSE QUALITATIVE U: >=1000 MG/DL
GLUCOSE SERPL-MCNC: 114 MG/DL
HCT VFR BLD CALC: 41.7 %
HDLC SERPL-MCNC: 33 MG/DL
HGB BLD-MCNC: 13.2 G/DL
KETONES URINE: NEGATIVE MG/DL
LDLC SERPL-MCNC: 36 MG/DL
LEUKOCYTE ESTERASE URINE: NEGATIVE
MAGNESIUM SERPL-MCNC: 2.4 MG/DL
MCHC RBC-ENTMCNC: 30.3 PG
MCHC RBC-ENTMCNC: 31.7 G/DL
MCV RBC AUTO: 95.6 FL
MICROSCOPIC-UA: NORMAL
NITRITE URINE: NEGATIVE
NONHDLC SERPL-MCNC: 69 MG/DL
PARATHYROID HORMONE INTACT: 46 PG/ML
PH URINE: 5.5
PHOSPHATE SERPL-MCNC: 4 MG/DL
PLATELET # BLD AUTO: 175 K/UL
POTASSIUM SERPL-SCNC: 4 MMOL/L
PROT SERPL-MCNC: 6.8 G/DL
PROT UR-MCNC: 44 MG/DL
PROTEIN URINE: 30 MG/DL
RBC # BLD: 4.36 M/UL
RBC # FLD: 14.5 %
RED BLOOD CELLS URINE: 6 /HPF
SODIUM SERPL-SCNC: 143 MMOL/L
SPECIFIC GRAVITY URINE: 1.02
TRIGL SERPL-MCNC: 209 MG/DL
UROBILINOGEN URINE: 0.2 MG/DL
WBC # FLD AUTO: 9.45 K/UL
WHITE BLOOD CELLS URINE: 0 /HPF

## 2025-08-20 ENCOUNTER — APPOINTMENT (OUTPATIENT)
Dept: NEPHROLOGY | Facility: CLINIC | Age: 75
End: 2025-08-20
Payer: COMMERCIAL

## 2025-08-20 VITALS — HEART RATE: 60 BPM | SYSTOLIC BLOOD PRESSURE: 120 MMHG | DIASTOLIC BLOOD PRESSURE: 80 MMHG

## 2025-08-20 PROCEDURE — 99212 OFFICE O/P EST SF 10 MIN: CPT | Mod: 93

## 2025-09-08 ENCOUNTER — APPOINTMENT (OUTPATIENT)
Dept: INTERNAL MEDICINE | Facility: CLINIC | Age: 75
End: 2025-09-08
Payer: COMMERCIAL

## 2025-09-08 VITALS
OXYGEN SATURATION: 98 % | SYSTOLIC BLOOD PRESSURE: 127 MMHG | HEIGHT: 66 IN | DIASTOLIC BLOOD PRESSURE: 79 MMHG | BODY MASS INDEX: 28.28 KG/M2 | WEIGHT: 176 LBS | HEART RATE: 68 BPM

## 2025-09-08 DIAGNOSIS — R73.03 PREDIABETES.: ICD-10-CM

## 2025-09-08 DIAGNOSIS — K58.1 IRRITABLE BOWEL SYNDROME WITH CONSTIPATION: ICD-10-CM

## 2025-09-08 DIAGNOSIS — M54.2 CERVICALGIA: ICD-10-CM

## 2025-09-08 DIAGNOSIS — R09.82 POSTNASAL DRIP: ICD-10-CM

## 2025-09-08 PROCEDURE — G2211 COMPLEX E/M VISIT ADD ON: CPT | Mod: NC

## 2025-09-08 PROCEDURE — 99214 OFFICE O/P EST MOD 30 MIN: CPT

## 2025-09-08 RX ORDER — LANCETS
EACH MISCELLANEOUS
Qty: 1 | Refills: 3 | Status: ACTIVE | COMMUNITY
Start: 2025-09-08 | End: 1900-01-01

## 2025-09-08 RX ORDER — BLOOD SUGAR DIAGNOSTIC
STRIP MISCELLANEOUS
Qty: 1 | Refills: 3 | Status: ACTIVE | COMMUNITY
Start: 2025-09-08 | End: 1900-01-01

## 2025-09-08 RX ORDER — BLOOD-GLUCOSE METER
W/DEVICE EACH MISCELLANEOUS
Qty: 1 | Refills: 0 | Status: ACTIVE | COMMUNITY
Start: 2025-09-08 | End: 1900-01-01

## 2025-09-08 RX ORDER — MOMETASONE 50 UG/1
50 SPRAY, METERED NASAL
Qty: 51 | Refills: 1 | Status: ACTIVE | COMMUNITY
Start: 2025-09-08 | End: 1900-01-01

## 2025-09-15 RX ORDER — LINACLOTIDE 290 UG/1
290 CAPSULE, GELATIN COATED ORAL
Qty: 90 | Refills: 3 | Status: ACTIVE | COMMUNITY
Start: 2025-09-08